# Patient Record
Sex: FEMALE | Race: WHITE | NOT HISPANIC OR LATINO
[De-identification: names, ages, dates, MRNs, and addresses within clinical notes are randomized per-mention and may not be internally consistent; named-entity substitution may affect disease eponyms.]

---

## 2022-06-24 RX ORDER — LISDEXAMFETAMINE DIMESYLATE 40 MG/1
CAPSULE ORAL
COMMUNITY

## 2022-12-28 PROBLEM — Z00.00 ENCOUNTER FOR PREVENTIVE HEALTH EXAMINATION: Status: ACTIVE | Noted: 2022-12-28

## 2023-01-13 ENCOUNTER — NON-APPOINTMENT (OUTPATIENT)
Age: 33
End: 2023-01-13

## 2023-01-18 ENCOUNTER — APPOINTMENT (OUTPATIENT)
Dept: OBGYN | Facility: CLINIC | Age: 33
End: 2023-01-18
Payer: COMMERCIAL

## 2023-01-18 ENCOUNTER — LABORATORY RESULT (OUTPATIENT)
Age: 33
End: 2023-01-18

## 2023-01-18 ENCOUNTER — TRANSCRIPTION ENCOUNTER (OUTPATIENT)
Age: 33
End: 2023-01-18

## 2023-01-18 ENCOUNTER — ASOB RESULT (OUTPATIENT)
Age: 33
End: 2023-01-18

## 2023-01-18 VITALS
DIASTOLIC BLOOD PRESSURE: 77 MMHG | HEART RATE: 71 BPM | SYSTOLIC BLOOD PRESSURE: 111 MMHG | BODY MASS INDEX: 22.33 KG/M2 | WEIGHT: 134 LBS | HEIGHT: 65 IN

## 2023-01-18 DIAGNOSIS — Z78.9 OTHER SPECIFIED HEALTH STATUS: ICD-10-CM

## 2023-01-18 DIAGNOSIS — G43.909 MIGRAINE, UNSPECIFIED, NOT INTRACTABLE, W/OUT STATUS MIGRAINOSUS: ICD-10-CM

## 2023-01-18 PROCEDURE — 76816 OB US FOLLOW-UP PER FETUS: CPT

## 2023-01-18 PROCEDURE — 0500F INITIAL PRENATAL CARE VISIT: CPT

## 2023-01-22 LAB
ABO + RH PNL BLD: NORMAL
ALBUMIN SERPL ELPH-MCNC: 4.4 G/DL
ALP BLD-CCNC: 47 U/L
ALT SERPL-CCNC: 10 U/L
ANION GAP SERPL CALC-SCNC: 12 MMOL/L
AST SERPL-CCNC: 14 U/L
B19V IGG SER QL IA: 8.79 INDEX
B19V IGG+IGM SER-IMP: NORMAL
B19V IGG+IGM SER-IMP: POSITIVE
B19V IGM FLD-ACNC: 0.17 INDEX
B19V IGM SER-ACNC: NEGATIVE
BACTERIA UR CULT: NORMAL
BASOPHILS # BLD AUTO: 0.03 K/UL
BASOPHILS NFR BLD AUTO: 0.3 %
BILIRUB SERPL-MCNC: 0.4 MG/DL
BLD GP AB SCN SERPL QL: NORMAL
BUN SERPL-MCNC: 10 MG/DL
C TRACH RRNA SPEC QL NAA+PROBE: NOT DETECTED
CALCIUM SERPL-MCNC: 9.3 MG/DL
CHLORIDE SERPL-SCNC: 102 MMOL/L
CO2 SERPL-SCNC: 20 MMOL/L
CREAT SERPL-MCNC: 0.6 MG/DL
EGFR: 122 ML/MIN/1.73M2
EOSINOPHIL # BLD AUTO: 0.07 K/UL
EOSINOPHIL NFR BLD AUTO: 0.8 %
GLUCOSE SERPL-MCNC: 60 MG/DL
HBV SURFACE AG SER QL: NONREACTIVE
HCT VFR BLD CALC: 39.3 %
HCV AB SER QL: NONREACTIVE
HCV S/CO RATIO: 0.07 S/CO
HGB BLD-MCNC: 13.4 G/DL
HIV1+2 AB SPEC QL IA.RAPID: NONREACTIVE
IMM GRANULOCYTES NFR BLD AUTO: 0.2 %
LEAD BLD-MCNC: <1 UG/DL
LYMPHOCYTES # BLD AUTO: 1.83 K/UL
LYMPHOCYTES NFR BLD AUTO: 20 %
MAN DIFF?: NORMAL
MCHC RBC-ENTMCNC: 30.4 PG
MCHC RBC-ENTMCNC: 34.1 GM/DL
MCV RBC AUTO: 89.1 FL
MONOCYTES # BLD AUTO: 0.73 K/UL
MONOCYTES NFR BLD AUTO: 8 %
N GONORRHOEA RRNA SPEC QL NAA+PROBE: NOT DETECTED
NEUTROPHILS # BLD AUTO: 6.45 K/UL
NEUTROPHILS NFR BLD AUTO: 70.7 %
PLATELET # BLD AUTO: 205 K/UL
POTASSIUM SERPL-SCNC: 4.1 MMOL/L
PROT SERPL-MCNC: 6.8 G/DL
RBC # BLD: 4.41 M/UL
RBC # FLD: 12.4 %
RUBV IGG FLD-ACNC: 1.9 INDEX
RUBV IGG SER-IMP: POSITIVE
SODIUM SERPL-SCNC: 135 MMOL/L
SOURCE AMPLIFICATION: NORMAL
T PALLIDUM AB SER QL IA: NEGATIVE
TSH SERPL-ACNC: 1.46 UIU/ML
VZV AB TITR SER: POSITIVE
VZV IGG SER IF-ACNC: 1158 INDEX
WBC # FLD AUTO: 9.13 K/UL

## 2023-01-24 ENCOUNTER — NON-APPOINTMENT (OUTPATIENT)
Age: 33
End: 2023-01-24

## 2023-02-06 PROBLEM — G43.909 MIGRAINES: Status: RESOLVED | Noted: 2023-02-06 | Resolved: 2023-02-06

## 2023-02-08 ENCOUNTER — NON-APPOINTMENT (OUTPATIENT)
Age: 33
End: 2023-02-08

## 2023-02-08 ENCOUNTER — APPOINTMENT (OUTPATIENT)
Dept: OBGYN | Facility: CLINIC | Age: 33
End: 2023-02-08
Payer: COMMERCIAL

## 2023-02-08 ENCOUNTER — APPOINTMENT (OUTPATIENT)
Dept: ANTEPARTUM | Facility: CLINIC | Age: 33
End: 2023-02-08
Payer: COMMERCIAL

## 2023-02-08 ENCOUNTER — ASOB RESULT (OUTPATIENT)
Age: 33
End: 2023-02-08

## 2023-02-08 PROCEDURE — 99211 OFF/OP EST MAY X REQ PHY/QHP: CPT

## 2023-02-08 PROCEDURE — 76801 OB US < 14 WKS SINGLE FETUS: CPT

## 2023-02-08 PROCEDURE — 76813 OB US NUCHAL MEAS 1 GEST: CPT

## 2023-02-13 ENCOUNTER — NON-APPOINTMENT (OUTPATIENT)
Age: 33
End: 2023-02-13

## 2023-02-15 ENCOUNTER — APPOINTMENT (OUTPATIENT)
Dept: OBGYN | Facility: CLINIC | Age: 33
End: 2023-02-15

## 2023-02-15 ENCOUNTER — NON-APPOINTMENT (OUTPATIENT)
Age: 33
End: 2023-02-15

## 2023-02-17 ENCOUNTER — NON-APPOINTMENT (OUTPATIENT)
Age: 33
End: 2023-02-17

## 2023-02-28 ENCOUNTER — APPOINTMENT (OUTPATIENT)
Dept: OBGYN | Facility: CLINIC | Age: 33
End: 2023-02-28
Payer: COMMERCIAL

## 2023-02-28 VITALS
BODY MASS INDEX: 23.82 KG/M2 | HEIGHT: 65 IN | DIASTOLIC BLOOD PRESSURE: 71 MMHG | SYSTOLIC BLOOD PRESSURE: 111 MMHG | HEART RATE: 81 BPM | WEIGHT: 143 LBS

## 2023-02-28 PROCEDURE — 0502F SUBSEQUENT PRENATAL CARE: CPT

## 2023-03-08 ENCOUNTER — NON-APPOINTMENT (OUTPATIENT)
Age: 33
End: 2023-03-08

## 2023-03-13 ENCOUNTER — NON-APPOINTMENT (OUTPATIENT)
Age: 33
End: 2023-03-13

## 2023-03-30 ENCOUNTER — APPOINTMENT (OUTPATIENT)
Dept: OBGYN | Facility: CLINIC | Age: 33
End: 2023-03-30

## 2023-03-30 VITALS
WEIGHT: 144 LBS | DIASTOLIC BLOOD PRESSURE: 68 MMHG | BODY MASS INDEX: 23.99 KG/M2 | SYSTOLIC BLOOD PRESSURE: 109 MMHG | HEIGHT: 65 IN

## 2023-03-30 VITALS — HEIGHT: 65 IN

## 2023-04-05 ENCOUNTER — ASOB RESULT (OUTPATIENT)
Age: 33
End: 2023-04-05

## 2023-04-05 ENCOUNTER — APPOINTMENT (OUTPATIENT)
Dept: ANTEPARTUM | Facility: CLINIC | Age: 33
End: 2023-04-05
Payer: COMMERCIAL

## 2023-04-05 PROCEDURE — 76811 OB US DETAILED SNGL FETUS: CPT

## 2023-04-05 PROCEDURE — 99203 OFFICE O/P NEW LOW 30 MIN: CPT | Mod: 25

## 2023-04-10 ENCOUNTER — TRANSCRIPTION ENCOUNTER (OUTPATIENT)
Age: 33
End: 2023-04-10

## 2023-04-10 ENCOUNTER — NON-APPOINTMENT (OUTPATIENT)
Age: 33
End: 2023-04-10

## 2023-04-10 LAB
AFP MOM: 1.91
AFP VALUE: 101.7 NG/ML
ALPHA FETOPROTEIN SERUM COMMENT: NORMAL
ALPHA FETOPROTEIN SERUM INTERPRETATION: NORMAL
ALPHA FETOPROTEIN SERUM RESULTS: NORMAL
ALPHA FETOPROTEIN SERUM TEST RESULTS: NORMAL
GESTATIONAL AGE BASED ON: NORMAL
GESTATIONAL AGE ON COLLECTION DATE: 19.1 WEEKS
INSULIN DEP DIABETES: NO
MATERNAL AGE AT EDD AFP: 33.3 YR
MULTIPLE GESTATION: NO
OSBR RISK 1 IN: 987
RACE: NORMAL
WEIGHT AFP: 144 LBS

## 2023-04-11 ENCOUNTER — TRANSCRIPTION ENCOUNTER (OUTPATIENT)
Age: 33
End: 2023-04-11

## 2023-04-18 ENCOUNTER — APPOINTMENT (OUTPATIENT)
Dept: ANTEPARTUM | Facility: CLINIC | Age: 33
End: 2023-04-18
Payer: COMMERCIAL

## 2023-04-18 ENCOUNTER — ASOB RESULT (OUTPATIENT)
Age: 33
End: 2023-04-18

## 2023-04-18 PROCEDURE — 93325 DOPPLER ECHO COLOR FLOW MAPG: CPT

## 2023-04-18 PROCEDURE — 76827 ECHO EXAM OF FETAL HEART: CPT

## 2023-04-18 PROCEDURE — 76825 ECHO EXAM OF FETAL HEART: CPT

## 2023-04-20 ENCOUNTER — TRANSCRIPTION ENCOUNTER (OUTPATIENT)
Age: 33
End: 2023-04-20

## 2023-04-25 ENCOUNTER — APPOINTMENT (OUTPATIENT)
Dept: OBGYN | Facility: CLINIC | Age: 33
End: 2023-04-25
Payer: COMMERCIAL

## 2023-04-25 ENCOUNTER — NON-APPOINTMENT (OUTPATIENT)
Age: 33
End: 2023-04-25

## 2023-04-25 VITALS
WEIGHT: 151 LBS | SYSTOLIC BLOOD PRESSURE: 101 MMHG | BODY MASS INDEX: 25.16 KG/M2 | HEIGHT: 65 IN | DIASTOLIC BLOOD PRESSURE: 67 MMHG

## 2023-04-25 PROCEDURE — 0502F SUBSEQUENT PRENATAL CARE: CPT

## 2023-05-18 ENCOUNTER — OUTPATIENT (OUTPATIENT)
Dept: INPATIENT UNIT | Facility: HOSPITAL | Age: 33
LOS: 1 days | Discharge: HOME | End: 2023-05-18
Payer: COMMERCIAL

## 2023-05-18 ENCOUNTER — NON-APPOINTMENT (OUTPATIENT)
Age: 33
End: 2023-05-18

## 2023-05-18 VITALS — HEART RATE: 75 BPM | DIASTOLIC BLOOD PRESSURE: 55 MMHG | TEMPERATURE: 98 F | SYSTOLIC BLOOD PRESSURE: 89 MMHG

## 2023-05-18 VITALS
DIASTOLIC BLOOD PRESSURE: 59 MMHG | TEMPERATURE: 98 F | RESPIRATION RATE: 16 BRPM | HEART RATE: 86 BPM | SYSTOLIC BLOOD PRESSURE: 89 MMHG

## 2023-05-18 DIAGNOSIS — O26.899 OTHER SPECIFIED PREGNANCY RELATED CONDITIONS, UNSPECIFIED TRIMESTER: ICD-10-CM

## 2023-05-18 LAB
APPEARANCE UR: CLEAR — SIGNIFICANT CHANGE UP
APTT BLD: 23.5 SEC — LOW (ref 27–36.3)
BASOPHILS # BLD AUTO: 0.02 K/UL — SIGNIFICANT CHANGE UP (ref 0–0.2)
BASOPHILS NFR BLD AUTO: 0.2 % — SIGNIFICANT CHANGE UP (ref 0–2)
BILIRUB UR-MCNC: NEGATIVE — SIGNIFICANT CHANGE UP
COLOR SPEC: SIGNIFICANT CHANGE UP
DIFF PNL FLD: NEGATIVE — SIGNIFICANT CHANGE UP
EOSINOPHIL # BLD AUTO: 0.11 K/UL — SIGNIFICANT CHANGE UP (ref 0–0.5)
EOSINOPHIL NFR BLD AUTO: 1 % — SIGNIFICANT CHANGE UP (ref 0–6)
FIBRINOGEN PPP-MCNC: 361 MG/DL — SIGNIFICANT CHANGE UP (ref 200–465)
GLUCOSE UR QL: NEGATIVE — SIGNIFICANT CHANGE UP
HCT VFR BLD CALC: 31.9 % — LOW (ref 34.5–45)
HGB BLD-MCNC: 10.5 G/DL — LOW (ref 11.5–15.5)
IANC: 7.87 K/UL — HIGH (ref 1.8–7.4)
IMM GRANULOCYTES NFR BLD AUTO: 0.6 % — SIGNIFICANT CHANGE UP (ref 0–0.9)
INR BLD: 0.96 RATIO — SIGNIFICANT CHANGE UP (ref 0.88–1.16)
KETONES UR-MCNC: NEGATIVE — SIGNIFICANT CHANGE UP
LEUKOCYTE ESTERASE UR-ACNC: NEGATIVE — SIGNIFICANT CHANGE UP
LYMPHOCYTES # BLD AUTO: 1.91 K/UL — SIGNIFICANT CHANGE UP (ref 1–3.3)
LYMPHOCYTES # BLD AUTO: 17.7 % — SIGNIFICANT CHANGE UP (ref 13–44)
MCHC RBC-ENTMCNC: 30.2 PG — SIGNIFICANT CHANGE UP (ref 27–34)
MCHC RBC-ENTMCNC: 32.9 GM/DL — SIGNIFICANT CHANGE UP (ref 32–36)
MCV RBC AUTO: 91.7 FL — SIGNIFICANT CHANGE UP (ref 80–100)
MONOCYTES # BLD AUTO: 0.79 K/UL — SIGNIFICANT CHANGE UP (ref 0–0.9)
MONOCYTES NFR BLD AUTO: 7.3 % — SIGNIFICANT CHANGE UP (ref 2–14)
NEUTROPHILS # BLD AUTO: 7.87 K/UL — HIGH (ref 1.8–7.4)
NEUTROPHILS NFR BLD AUTO: 73.2 % — SIGNIFICANT CHANGE UP (ref 43–77)
NITRITE UR-MCNC: NEGATIVE — SIGNIFICANT CHANGE UP
NRBC # BLD: 0 /100 WBCS — SIGNIFICANT CHANGE UP (ref 0–0)
NRBC # FLD: 0 K/UL — SIGNIFICANT CHANGE UP (ref 0–0)
PH UR: 6.5 — SIGNIFICANT CHANGE UP (ref 5–8)
PLATELET # BLD AUTO: 206 K/UL — SIGNIFICANT CHANGE UP (ref 150–400)
PROT UR-MCNC: NEGATIVE — SIGNIFICANT CHANGE UP
PROTHROM AB SERPL-ACNC: 11.1 SEC — SIGNIFICANT CHANGE UP (ref 10.5–13.4)
RBC # BLD: 3.48 M/UL — LOW (ref 3.8–5.2)
RBC # FLD: 13.3 % — SIGNIFICANT CHANGE UP (ref 10.3–14.5)
SP GR SPEC: 1.01 — SIGNIFICANT CHANGE UP (ref 1.01–1.05)
UROBILINOGEN FLD QL: SIGNIFICANT CHANGE UP
WBC # BLD: 10.77 K/UL — HIGH (ref 3.8–10.5)
WBC # FLD AUTO: 10.77 K/UL — HIGH (ref 3.8–10.5)

## 2023-05-18 PROCEDURE — 59025 FETAL NON-STRESS TEST: CPT | Mod: 26

## 2023-05-18 PROCEDURE — 99221 1ST HOSP IP/OBS SF/LOW 40: CPT | Mod: 25

## 2023-05-18 NOTE — OB PROVIDER TRIAGE NOTE - ADDITIONAL INSTRUCTIONS
- Patient stable for discharge home with adequate outpatient follow up  - Instructed patient to follow up with OB/GYN within 1 week  - Educated and discussed  labor precautions  - Educated and discussed concerning signs/symptoms to call OB/GYN and return to L&D including but not limited to vaginal bleeding, leakage of fluid, painful contractions, decreased fetal movement, fever/chills, shortness of breath, chest pain, rash, difficulty ambulating, nausea/vomiting/diarrhea, worsening of symptoms

## 2023-05-18 NOTE — OB RN TRIAGE NOTE - FALL HARM RISK - RISK INTERVENTIONS

## 2023-05-18 NOTE — OB PROVIDER TRIAGE NOTE - NSOBPROVIDERNOTE_OBGYN_ALL_OB_FT
32 y/o  IVF pregnancy at 26.1 weeks presents s/p falling down 2 steps at 5pm  - Abruption labs sent, wnl  - Cervix appears closed, CL 3.74-4.18  - Cold compress given for ankle 34 y/o  IVF pregnancy at 26.1 weeks presents s/p falling down 2 steps at 5pm  - Abruption labs sent, wnl  - Reactive NST x 4 hrs   - Cervix appears closed, CL 3.74-4.18  - Cold compress given for ankle  - Patient stable for discharge home with adequate outpatient follow up  - Instructed patient to follow up with OB/GYN within 1 week  - Educated and discussed  labor precautions  - Educated and discussed concerning signs/symptoms to call OB/GYN and return to L&D including but not limited to vaginal bleeding, leakage of fluid, painful contractions, decreased fetal movement, fever/chills, shortness of breath, chest pain, rash, difficulty ambulating, nausea/vomiting/diarrhea, worsening of symptoms  - Patient states she understands and agrees with assessment and plan, all questions answered  - Discussed with Dr. Cheney  - Patient discharged at 11:43pm

## 2023-05-18 NOTE — OB PROVIDER TRIAGE NOTE - NSHPPHYSICALEXAM_GEN_ALL_CORE
Vital Signs Last 24 Hrs  T(C): 36.5 (18 May 2023 19:28), Max: 36.5 (18 May 2023 19:19)  T(F): 97.7 (18 May 2023 19:28), Max: 97.7 (18 May 2023 19:19)  HR: 82 (18 May 2023 21:02) (82 - 86)  BP: 96/58 (18 May 2023 21:02) (89/59 - 96/58)  BP(mean): --  RR: 16 (18 May 2023 19:32) (16 - 16)  SpO2: --    A&O x 3  Lungs: CTAB  Abd: gravid, soft nontender to palpation  EFM: baseline 145, moderate variability, + accels, no decels, no ctx  SSE: cervix closed  TAS: breech presentation, anterior placenta - ?partial placenta previa, good fetal movement, MVP 5.16mm  TVS: CL 3.74-4.18 Vital Signs Last 24 Hrs  T(C): 36.5 (18 May 2023 19:28), Max: 36.5 (18 May 2023 19:19)  T(F): 97.7 (18 May 2023 19:28), Max: 97.7 (18 May 2023 19:19)  HR: 82 (18 May 2023 21:02) (82 - 86)  BP: 96/58 (18 May 2023 21:02) (89/59 - 96/58)  BP(mean): --  RR: 16 (18 May 2023 19:32) (16 - 16)  SpO2: --    A&O x 3  Lungs: CTAB  Abd: gravid, soft nontender to palpation  Ext: full ROM of ankles B/L, no swelling   EFM: baseline 145, moderate variability, + accels, no decels, no ctx  SSE: cervix closed  TAS: breech presentation, anterior placenta - ?partial placenta previa, good fetal movement, MVP 5.16mm  TVS: CL 3.74-4.18

## 2023-05-18 NOTE — OB PROVIDER TRIAGE NOTE - PRINCIPAL DIAGNOSIS
Injury, poisoning and certain other consequences of external causes complicating pregnancy, unspecified trimester

## 2023-05-18 NOTE — OB PROVIDER TRIAGE NOTE - NSHPLABSRESULTS_GEN_ALL_CORE
CBC Full  -  ( 18 May 2023 19:50 )  WBC Count : 10.77 K/uL  RBC Count : 3.48 M/uL  Hemoglobin : 10.5 g/dL  Hematocrit : 31.9 %  Platelet Count - Automated : 206 K/uL  Mean Cell Volume : 91.7 fL  Mean Cell Hemoglobin : 30.2 pg  Mean Cell Hemoglobin Concentration : 32.9 gm/dL  Auto Neutrophil # : 7.87 K/uL  Auto Lymphocyte # : 1.91 K/uL  Auto Monocyte # : 0.79 K/uL  Auto Eosinophil # : 0.11 K/uL  Auto Basophil # : 0.02 K/uL  Auto Neutrophil % : 73.2 %  Auto Lymphocyte % : 17.7 %  Auto Monocyte % : 7.3 %  Auto Eosinophil % : 1.0 %  Auto Basophil % : 0.2 %    PT/INR - ( 18 May 2023 19:50 )   PT: 11.1 sec;   INR: 0.96 ratio         PTT - ( 18 May 2023 19:50 )  PTT:23.5 sec      Urinalysis Basic - ( 18 May 2023 22:00 )    Color: Light Yellow / Appearance: Clear / S.010 / pH: x  Gluc: x / Ketone: Negative  / Bili: Negative / Urobili: <2 mg/dL   Blood: x / Protein: Negative / Nitrite: Negative   Leuk Esterase: Negative / RBC: x / WBC x   Sq Epi: x / Non Sq Epi: x / Bacteria: x

## 2023-05-18 NOTE — OB PROVIDER TRIAGE NOTE - HISTORY OF PRESENT ILLNESS
32 y/o  IVF pregnancy at 26.1 weeks presents s/p falling down 2 steps. States she was running after her dog when she missed a step and twisted her ankle. Patient states she does not know how she landed but does not think she hit her abdomen or head. Denies . Reports good fetal movement. Denies contractions, vaginal bleeding, leakage of clear fluid.   PNC significant for placenta previa 34 y/o  IVF pregnancy at 26.1 weeks presents s/p falling down 2 steps at 5pm. States she was running after her dog when she missed a step and twisted her ankle. Patient states she does not know how she landed but does not think she hit her abdomen or head. Denies abdominal pain, contractions, vaginal bleeding, leakage of clear fluid. Patient is able to bare weight on right ankle. Reports good fetal movement.  PNC significant for placenta previa

## 2023-05-19 DIAGNOSIS — O09.292 SUPERVISION OF PREGNANCY WITH OTHER POOR REPRODUCTIVE OR OBSTETRIC HISTORY, SECOND TRIMESTER: ICD-10-CM

## 2023-05-19 DIAGNOSIS — Z04.3 ENCOUNTER FOR EXAMINATION AND OBSERVATION FOLLOWING OTHER ACCIDENT: ICD-10-CM

## 2023-05-19 DIAGNOSIS — O44.02 COMPLETE PLACENTA PREVIA NOS OR WITHOUT HEMORRHAGE, SECOND TRIMESTER: ICD-10-CM

## 2023-05-19 DIAGNOSIS — Z3A.26 26 WEEKS GESTATION OF PREGNANCY: ICD-10-CM

## 2023-05-19 DIAGNOSIS — O09.812 SUPERVISION OF PREGNANCY RESULTING FROM ASSISTED REPRODUCTIVE TECHNOLOGY, SECOND TRIMESTER: ICD-10-CM

## 2023-05-19 LAB — KLEIHAUER-BETKE CALCULATION: 0 % — SIGNIFICANT CHANGE UP

## 2023-05-22 ENCOUNTER — NON-APPOINTMENT (OUTPATIENT)
Age: 33
End: 2023-05-22

## 2023-05-22 ENCOUNTER — APPOINTMENT (OUTPATIENT)
Dept: OBGYN | Facility: CLINIC | Age: 33
End: 2023-05-22
Payer: COMMERCIAL

## 2023-05-22 VITALS
HEIGHT: 65 IN | HEART RATE: 91 BPM | DIASTOLIC BLOOD PRESSURE: 73 MMHG | BODY MASS INDEX: 25.91 KG/M2 | WEIGHT: 155.5 LBS | SYSTOLIC BLOOD PRESSURE: 109 MMHG

## 2023-05-22 VITALS — SYSTOLIC BLOOD PRESSURE: 113 MMHG | DIASTOLIC BLOOD PRESSURE: 80 MMHG

## 2023-05-22 PROCEDURE — 0502F SUBSEQUENT PRENATAL CARE: CPT

## 2023-05-25 ENCOUNTER — NON-APPOINTMENT (OUTPATIENT)
Age: 33
End: 2023-05-25

## 2023-05-25 LAB
ABO + RH PNL BLD: NORMAL
ALBUMIN SERPL ELPH-MCNC: 3.7 G/DL
ALP BLD-CCNC: 65 U/L
ALT SERPL-CCNC: 9 U/L
ANION GAP SERPL CALC-SCNC: 16 MMOL/L
AST SERPL-CCNC: 15 U/L
BILIRUB SERPL-MCNC: 0.2 MG/DL
BLD GP AB SCN SERPL QL: NORMAL
BUN SERPL-MCNC: 11 MG/DL
CALCIUM SERPL-MCNC: 8.6 MG/DL
CHLORIDE SERPL-SCNC: 99 MMOL/L
CO2 SERPL-SCNC: 18 MMOL/L
CREAT SERPL-MCNC: 0.55 MG/DL
EGFR: 124 ML/MIN/1.73M2
GLUCOSE 1H P 50 G GLC PO SERPL-MCNC: 193 MG/DL
POTASSIUM SERPL-SCNC: 3.6 MMOL/L
PROT SERPL-MCNC: 5.8 G/DL
SODIUM SERPL-SCNC: 134 MMOL/L
T PALLIDUM AB SER QL IA: NEGATIVE

## 2023-05-27 ENCOUNTER — NON-APPOINTMENT (OUTPATIENT)
Age: 33
End: 2023-05-27

## 2023-05-27 ENCOUNTER — TRANSCRIPTION ENCOUNTER (OUTPATIENT)
Age: 33
End: 2023-05-27

## 2023-05-30 ENCOUNTER — APPOINTMENT (OUTPATIENT)
Dept: ANTEPARTUM | Facility: CLINIC | Age: 33
End: 2023-05-30

## 2023-05-30 ENCOUNTER — ASOB RESULT (OUTPATIENT)
Age: 33
End: 2023-05-30

## 2023-05-30 ENCOUNTER — APPOINTMENT (OUTPATIENT)
Dept: ANTEPARTUM | Facility: CLINIC | Age: 33
End: 2023-05-30
Payer: COMMERCIAL

## 2023-05-30 PROCEDURE — 76816 OB US FOLLOW-UP PER FETUS: CPT

## 2023-05-31 ENCOUNTER — APPOINTMENT (OUTPATIENT)
Dept: OBGYN | Facility: CLINIC | Age: 33
End: 2023-05-31

## 2023-06-01 ENCOUNTER — APPOINTMENT (OUTPATIENT)
Dept: OBGYN | Facility: CLINIC | Age: 33
End: 2023-06-01

## 2023-06-02 ENCOUNTER — NON-APPOINTMENT (OUTPATIENT)
Age: 33
End: 2023-06-02

## 2023-06-03 ENCOUNTER — NON-APPOINTMENT (OUTPATIENT)
Age: 33
End: 2023-06-03

## 2023-06-07 ENCOUNTER — APPOINTMENT (OUTPATIENT)
Dept: MATERNAL FETAL MEDICINE | Facility: CLINIC | Age: 33
End: 2023-06-07
Payer: COMMERCIAL

## 2023-06-07 ENCOUNTER — ASOB RESULT (OUTPATIENT)
Age: 33
End: 2023-06-07

## 2023-06-07 PROCEDURE — G0109 DIAB MANAGE TRN IND/GROUP: CPT | Mod: 95

## 2023-06-12 ENCOUNTER — TRANSCRIPTION ENCOUNTER (OUTPATIENT)
Age: 33
End: 2023-06-12

## 2023-06-13 ENCOUNTER — APPOINTMENT (OUTPATIENT)
Dept: OBGYN | Facility: CLINIC | Age: 33
End: 2023-06-13

## 2023-06-14 ENCOUNTER — NON-APPOINTMENT (OUTPATIENT)
Age: 33
End: 2023-06-14

## 2023-06-14 ENCOUNTER — RESULT REVIEW (OUTPATIENT)
Age: 33
End: 2023-06-14

## 2023-06-14 ENCOUNTER — ASOB RESULT (OUTPATIENT)
Age: 33
End: 2023-06-14

## 2023-06-14 ENCOUNTER — APPOINTMENT (OUTPATIENT)
Dept: OBGYN | Facility: CLINIC | Age: 33
End: 2023-06-14
Payer: COMMERCIAL

## 2023-06-14 ENCOUNTER — APPOINTMENT (OUTPATIENT)
Dept: MATERNAL FETAL MEDICINE | Facility: CLINIC | Age: 33
End: 2023-06-14
Payer: COMMERCIAL

## 2023-06-14 VITALS
HEIGHT: 65 IN | BODY MASS INDEX: 25.49 KG/M2 | HEART RATE: 76 BPM | WEIGHT: 153 LBS | DIASTOLIC BLOOD PRESSURE: 72 MMHG | SYSTOLIC BLOOD PRESSURE: 107 MMHG

## 2023-06-14 PROCEDURE — 0502F SUBSEQUENT PRENATAL CARE: CPT

## 2023-06-14 PROCEDURE — G0108 DIAB MANAGE TRN  PER INDIV: CPT | Mod: 95

## 2023-06-15 ENCOUNTER — NON-APPOINTMENT (OUTPATIENT)
Age: 33
End: 2023-06-15

## 2023-06-15 ENCOUNTER — APPOINTMENT (OUTPATIENT)
Dept: OBGYN | Facility: CLINIC | Age: 33
End: 2023-06-15
Payer: COMMERCIAL

## 2023-06-15 PROCEDURE — 96372 THER/PROPH/DIAG INJ SC/IM: CPT

## 2023-06-15 RX ADMIN — HUMAN RHO(D) IMMUNE GLOBULIN 0 UNIT: 300 INJECTION, SOLUTION INTRAMUSCULAR at 00:00

## 2023-06-16 ENCOUNTER — OUTPATIENT (OUTPATIENT)
Dept: OUTPATIENT SERVICES | Facility: HOSPITAL | Age: 33
LOS: 1 days | End: 2023-06-16
Payer: COMMERCIAL

## 2023-06-16 ENCOUNTER — RESULT REVIEW (OUTPATIENT)
Age: 33
End: 2023-06-16

## 2023-06-16 ENCOUNTER — APPOINTMENT (OUTPATIENT)
Dept: ULTRASOUND IMAGING | Facility: CLINIC | Age: 33
End: 2023-06-16
Payer: COMMERCIAL

## 2023-06-16 DIAGNOSIS — R59.0 LOCALIZED ENLARGED LYMPH NODES: ICD-10-CM

## 2023-06-16 PROCEDURE — 76641 ULTRASOUND BREAST COMPLETE: CPT

## 2023-06-16 PROCEDURE — 76641 ULTRASOUND BREAST COMPLETE: CPT | Mod: 26,RT

## 2023-06-20 ENCOUNTER — TRANSCRIPTION ENCOUNTER (OUTPATIENT)
Age: 33
End: 2023-06-20

## 2023-06-27 ENCOUNTER — ASOB RESULT (OUTPATIENT)
Age: 33
End: 2023-06-27

## 2023-06-27 ENCOUNTER — APPOINTMENT (OUTPATIENT)
Dept: ANTEPARTUM | Facility: CLINIC | Age: 33
End: 2023-06-27
Payer: COMMERCIAL

## 2023-06-27 ENCOUNTER — NON-APPOINTMENT (OUTPATIENT)
Age: 33
End: 2023-06-27

## 2023-06-27 PROCEDURE — 76816 OB US FOLLOW-UP PER FETUS: CPT

## 2023-06-27 PROCEDURE — 76819 FETAL BIOPHYS PROFIL W/O NST: CPT

## 2023-06-28 ENCOUNTER — NON-APPOINTMENT (OUTPATIENT)
Age: 33
End: 2023-06-28

## 2023-06-29 ENCOUNTER — NON-APPOINTMENT (OUTPATIENT)
Age: 33
End: 2023-06-29

## 2023-06-29 ENCOUNTER — APPOINTMENT (OUTPATIENT)
Dept: OBGYN | Facility: CLINIC | Age: 33
End: 2023-06-29
Payer: COMMERCIAL

## 2023-06-29 ENCOUNTER — APPOINTMENT (OUTPATIENT)
Dept: MATERNAL FETAL MEDICINE | Facility: CLINIC | Age: 33
End: 2023-06-29
Payer: COMMERCIAL

## 2023-06-29 ENCOUNTER — ASOB RESULT (OUTPATIENT)
Age: 33
End: 2023-06-29

## 2023-06-29 VITALS
WEIGHT: 156.2 LBS | HEART RATE: 73 BPM | HEIGHT: 65 IN | BODY MASS INDEX: 26.02 KG/M2 | SYSTOLIC BLOOD PRESSURE: 111 MMHG | DIASTOLIC BLOOD PRESSURE: 75 MMHG

## 2023-06-29 PROCEDURE — 0502F SUBSEQUENT PRENATAL CARE: CPT

## 2023-06-29 PROCEDURE — G0108 DIAB MANAGE TRN  PER INDIV: CPT | Mod: 95

## 2023-06-29 RX ORDER — ONDANSETRON 8 MG/1
8 TABLET, ORALLY DISINTEGRATING ORAL
Qty: 24 | Refills: 1 | Status: COMPLETED | COMMUNITY
Start: 2023-01-31 | End: 2023-06-29

## 2023-06-29 RX ORDER — DOXYLAMINE SUCCINATE AND PYRIDOXINE HYDROCHLORIDE 10; 10 MG/1; MG/1
10-10 TABLET, DELAYED RELEASE ORAL
Qty: 120 | Refills: 1 | Status: COMPLETED | COMMUNITY
End: 2023-06-29

## 2023-07-05 ENCOUNTER — OUTPATIENT (OUTPATIENT)
Dept: OUTPATIENT SERVICES | Facility: HOSPITAL | Age: 33
LOS: 1 days | Discharge: HOME | End: 2023-07-05
Payer: COMMERCIAL

## 2023-07-05 ENCOUNTER — NON-APPOINTMENT (OUTPATIENT)
Age: 33
End: 2023-07-05

## 2023-07-05 VITALS
RESPIRATION RATE: 16 BRPM | HEART RATE: 71 BPM | DIASTOLIC BLOOD PRESSURE: 62 MMHG | SYSTOLIC BLOOD PRESSURE: 116 MMHG | TEMPERATURE: 98 F

## 2023-07-05 VITALS — SYSTOLIC BLOOD PRESSURE: 94 MMHG | HEART RATE: 64 BPM | DIASTOLIC BLOOD PRESSURE: 55 MMHG

## 2023-07-05 DIAGNOSIS — Z98.890 OTHER SPECIFIED POSTPROCEDURAL STATES: Chronic | ICD-10-CM

## 2023-07-05 DIAGNOSIS — O26.899 OTHER SPECIFIED PREGNANCY RELATED CONDITIONS, UNSPECIFIED TRIMESTER: ICD-10-CM

## 2023-07-05 PROCEDURE — 99221 1ST HOSP IP/OBS SF/LOW 40: CPT

## 2023-07-05 NOTE — OB RN TRIAGE NOTE - FALL HARM RISK - HARM RISK INTERVENTIONS

## 2023-07-05 NOTE — OB PROVIDER TRIAGE NOTE - NSOBPROVIDERNOTE_OBGYN_ALL_OB_FT
Patient is a 32 y/o EDC 2023 EGA 33  Patient is a 34 y/o EDC 2023 EGA 33  evaluated for decreased fetal movement in third trimester. Patient reports of fetal movement and visualized fetal movement  - Discussed with   - Patient to be discharged home with follow up and return precautions  - Please follow up with your obstetrician at your next scheduled appointment next week  - Please return for decreased/no fetal movement, vaginal bleeding similar to that of a period, leaking/gush of fluid, regular contractions or requiring pain medication   - Patient educated of plan and demonstrate understanding. All questions answered. Discharge instructions provided and signed.   - Discharged at 1834

## 2023-07-05 NOTE — OB PROVIDER TRIAGE NOTE - NSHPPHYSICALEXAM_GEN_ALL_CORE
Vital Signs Last 24 Hrs  T(C): 36.4 (05 Jul 2023 17:21), Max: 36.4 (05 Jul 2023 17:21)  T(F): 97.5 (05 Jul 2023 17:21), Max: 97.5 (05 Jul 2023 17:21)  HR: 68 (05 Jul 2023 18:15) (68 - 71)  BP: 104/65 (05 Jul 2023 18:15) (104/64 - 116/62)  RR: 16 (05 Jul 2023 17:21) (16 - 16)    Neuro: Alert and oriented  Lungs: no shortness of breath  Abdomen: soft, non-tender, no contractions on palpation  TAS: Images saved in ASOB, report placed in ASOB. Breech, anterior placenta, m-mode 134, 8/8 BPP, CORI 17.99  FHR: 140 baseline with moderate variability, accelerations present, no decelerations, reactive tracing   CTX: irritability present, patient denies lower back pain, cramping, contractions   LE: no edema present, no calf tenderness Vital Signs Last 24 Hrs  T(C): 36.4 (05 Jul 2023 17:21), Max: 36.4 (05 Jul 2023 17:21)  T(F): 97.5 (05 Jul 2023 17:21), Max: 97.5 (05 Jul 2023 17:21)  HR: 68 (05 Jul 2023 18:15) (68 - 71)  BP: 104/65 (05 Jul 2023 18:15) (104/64 - 116/62)  RR: 16 (05 Jul 2023 17:21) (16 - 16)    Neuro: Alert and oriented  Lungs: no shortness of breath  Abdomen: soft, non-tender, no contractions on palpation  TAS: Images saved in ASOB, no report placed in ASOB. Breech, anterior placenta, m-mode 134, 8/8 BPP, CORI 17.99  FHR: 140 baseline with moderate variability, accelerations present, no decelerations, reactive tracing   CTX: irritability present, patient denies lower back pain, cramping, contractions   LE: no edema present, no calf tenderness

## 2023-07-05 NOTE — OB PROVIDER TRIAGE NOTE - ADDITIONAL INSTRUCTIONS
Patient is a 32 y/o EDC 2023 EGA 33  evaluated for decreased fetal movement in third trimester. Patient reports of fetal movement and visualized fetal movement  - Discussed with   - Patient to be discharged home with follow up and return precautions  - Please follow up with your obstetrician at your next scheduled appointment next week  - Please return for decreased/no fetal movement, vaginal bleeding similar to that of a period, leaking/gush of fluid, regular contractions or requiring pain medication   - Patient educated of plan and demonstrate understanding. All questions answered. Discharge instructions provided and signed.   - Discharged at 1834

## 2023-07-05 NOTE — OB PROVIDER TRIAGE NOTE - GRAVIDA, OB PROFILE
Carolyn is a pleasant 66-year-old female who presents today for a follow up.   Patient currently on Xarelto.  History of diverticulitis and colovesicular fistula.  Status post laparoscopic sigmoid colectomy with colorectal anastomosis, takedown of colovesicular fistula on 12/20/2021.  No evidence of dysplasia or malignancy   Labs dated 1/26/2022 showed a normal hemoglobin.  Normal platelets.  Normal ferritin.  Iron saturation 12% but normal total iron.  Normal vitamin B12/folate  Labs dated 4/6/2022 showed a normal hemoglobin.  Platelets normal.  Normal creatinine.  CT abdomen/pelvis with contrast dated 4/15/2022 showed mildly prominent mesenteric with contiguous soft tissue stranding within the left upper abdomen.  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  Hepatic and left renal cyst.  Small hiatal hernia.  Postsurgical changes related to the sigmoid colon.  Colonic diverticulosis without CT evidence for diverticulitis   CT abdomen/pelvis with contrast dated 12/15/2021 showed diverticulitis of the sigmoid colon with involvement of the bladder wall and either a fistula between the bladder or secondary infection of the bladder given the gas bubble.    Labs dated 12/24/2021 showed RBC 3.51, hemoglobin 9.2, hematocrit 29.3, INR 1.18, potassium 3.4, albumin 3.1, ALT 49 otherwise normal LFTs.  Normal renal function.    Patient's last colonoscopy was about 6 years ago with a personal history of colon polyps. No record available for review. From June 2022 to Dec 2022 she was having normal bowel movements. Ever since she has been dealing with constipation. Last bowel movement before today was Thursday. Notes relief of her bloating after BM today.  Denies nausea, vomiting, heartburn, reflux, dysphagia, odynophagia, hematemesis, coffee ground emesis, abnormal weight loss, or melena. Occasional BRBPR when wiping. Family history of colorectal cancer in her father at age 55. No other GI complaints at this time
2

## 2023-07-05 NOTE — OB PROVIDER TRIAGE NOTE - HISTORY OF PRESENT ILLNESS
PNC:     Patient is a 32 y/o EDC 2023 EGA 33  reports of no fetal movement. Patient typically does not drink caffeine drinks, patient reports no fetal movement noted. Patient reports of fetal movement at time of obtaining patient's history. Patient denies cramping, contractions, loss of fluid, vaginal bleeding.     Antepartum History:   - Placenta previa, resolved  - 2023 - anterior placenta, no previa noted  - GDMA1  - 2023 ATU: breech presentation, anterior placenta, CORI 21.14,  BPP, EFW 2252 4lb 15oz, 91%tile  - 2023 : Palpable mass in right axilla, Birad 2, 5mm cyst

## 2023-07-06 ENCOUNTER — TRANSCRIPTION ENCOUNTER (OUTPATIENT)
Age: 33
End: 2023-07-06

## 2023-07-06 DIAGNOSIS — L72.9 FOLLICULAR CYST OF THE SKIN AND SUBCUTANEOUS TISSUE, UNSPECIFIED: ICD-10-CM

## 2023-07-06 DIAGNOSIS — O99.713 DISEASES OF THE SKIN AND SUBCUTANEOUS TISSUE COMPLICATING PREGNANCY, THIRD TRIMESTER: ICD-10-CM

## 2023-07-06 DIAGNOSIS — O24.410 GESTATIONAL DIABETES MELLITUS IN PREGNANCY, DIET CONTROLLED: ICD-10-CM

## 2023-07-06 DIAGNOSIS — O36.8130 DECREASED FETAL MOVEMENTS, THIRD TRIMESTER, NOT APPLICABLE OR UNSPECIFIED: ICD-10-CM

## 2023-07-06 DIAGNOSIS — Z3A.33 33 WEEKS GESTATION OF PREGNANCY: ICD-10-CM

## 2023-07-06 DIAGNOSIS — O09.293 SUPERVISION OF PREGNANCY WITH OTHER POOR REPRODUCTIVE OR OBSTETRIC HISTORY, THIRD TRIMESTER: ICD-10-CM

## 2023-07-11 ENCOUNTER — TRANSCRIPTION ENCOUNTER (OUTPATIENT)
Age: 33
End: 2023-07-11

## 2023-07-12 ENCOUNTER — TRANSCRIPTION ENCOUNTER (OUTPATIENT)
Age: 33
End: 2023-07-12

## 2023-07-13 ENCOUNTER — APPOINTMENT (OUTPATIENT)
Dept: OBGYN | Facility: CLINIC | Age: 33
End: 2023-07-13

## 2023-07-13 ENCOUNTER — APPOINTMENT (OUTPATIENT)
Dept: OBGYN | Facility: CLINIC | Age: 33
End: 2023-07-13
Payer: COMMERCIAL

## 2023-07-13 VITALS
SYSTOLIC BLOOD PRESSURE: 112 MMHG | HEART RATE: 81 BPM | HEIGHT: 65 IN | BODY MASS INDEX: 26.33 KG/M2 | DIASTOLIC BLOOD PRESSURE: 74 MMHG | WEIGHT: 158 LBS

## 2023-07-13 DIAGNOSIS — Z23 ENCOUNTER FOR IMMUNIZATION: ICD-10-CM

## 2023-07-13 PROCEDURE — 90715 TDAP VACCINE 7 YRS/> IM: CPT

## 2023-07-13 PROCEDURE — 0502F SUBSEQUENT PRENATAL CARE: CPT

## 2023-07-13 PROCEDURE — 90471 IMMUNIZATION ADMIN: CPT

## 2023-07-17 ENCOUNTER — NON-APPOINTMENT (OUTPATIENT)
Age: 33
End: 2023-07-17

## 2023-07-17 ENCOUNTER — TRANSCRIPTION ENCOUNTER (OUTPATIENT)
Age: 33
End: 2023-07-17

## 2023-07-18 ENCOUNTER — TRANSCRIPTION ENCOUNTER (OUTPATIENT)
Age: 33
End: 2023-07-18

## 2023-07-19 ENCOUNTER — NON-APPOINTMENT (OUTPATIENT)
Age: 33
End: 2023-07-19

## 2023-07-20 ENCOUNTER — ASOB RESULT (OUTPATIENT)
Age: 33
End: 2023-07-20

## 2023-07-20 ENCOUNTER — APPOINTMENT (OUTPATIENT)
Dept: MATERNAL FETAL MEDICINE | Facility: CLINIC | Age: 33
End: 2023-07-20
Payer: COMMERCIAL

## 2023-07-20 PROCEDURE — G0108 DIAB MANAGE TRN  PER INDIV: CPT | Mod: 95

## 2023-07-25 ENCOUNTER — APPOINTMENT (OUTPATIENT)
Dept: ANTEPARTUM | Facility: CLINIC | Age: 33
End: 2023-07-25
Payer: COMMERCIAL

## 2023-07-25 ENCOUNTER — ASOB RESULT (OUTPATIENT)
Age: 33
End: 2023-07-25

## 2023-07-25 PROCEDURE — 76818 FETAL BIOPHYS PROFILE W/NST: CPT

## 2023-07-25 PROCEDURE — 99213 OFFICE O/P EST LOW 20 MIN: CPT | Mod: 25

## 2023-07-25 PROCEDURE — 76816 OB US FOLLOW-UP PER FETUS: CPT

## 2023-07-27 ENCOUNTER — APPOINTMENT (OUTPATIENT)
Dept: OBGYN | Facility: CLINIC | Age: 33
End: 2023-07-27
Payer: COMMERCIAL

## 2023-07-27 VITALS
WEIGHT: 158 LBS | SYSTOLIC BLOOD PRESSURE: 109 MMHG | DIASTOLIC BLOOD PRESSURE: 71 MMHG | BODY MASS INDEX: 26.33 KG/M2 | HEIGHT: 65 IN

## 2023-07-27 PROCEDURE — 0502F SUBSEQUENT PRENATAL CARE: CPT

## 2023-07-29 ENCOUNTER — OUTPATIENT (OUTPATIENT)
Dept: INPATIENT UNIT | Facility: HOSPITAL | Age: 33
LOS: 1 days | Discharge: HOME | End: 2023-07-29
Payer: COMMERCIAL

## 2023-07-29 VITALS
DIASTOLIC BLOOD PRESSURE: 75 MMHG | SYSTOLIC BLOOD PRESSURE: 111 MMHG | TEMPERATURE: 98 F | RESPIRATION RATE: 15 BRPM | HEART RATE: 76 BPM

## 2023-07-29 DIAGNOSIS — Z98.890 OTHER SPECIFIED POSTPROCEDURAL STATES: Chronic | ICD-10-CM

## 2023-07-29 DIAGNOSIS — O26.899 OTHER SPECIFIED PREGNANCY RELATED CONDITIONS, UNSPECIFIED TRIMESTER: ICD-10-CM

## 2023-07-29 PROCEDURE — 99221 1ST HOSP IP/OBS SF/LOW 40: CPT | Mod: 25

## 2023-07-29 PROCEDURE — 59025 FETAL NON-STRESS TEST: CPT | Mod: 26

## 2023-07-29 NOTE — OB PROVIDER TRIAGE NOTE - HISTORY OF PRESENT ILLNESS
33y , EGA@ 36.3 weeks, presents for decreased fetal movement x 5 hrs. Patient denies contractions, denies leaking of fluid, denies vaginal bleeding. Pt denies any other concerns.  Antepartum course is significant for:  - GDMA1  - Macrosomia   - IVF pregnancy

## 2023-07-29 NOTE — OB PROVIDER TRIAGE NOTE - NS_TRIAGEEVALUATION_OBGYN_ALL_OB_DT
Dr. Buchanan;   Please see phone message below    1. Should patient be taking ASA?    2. Can Culturell and carbidopa be taken at the same time    3. Patient coughs after taking medications with applesauce. Is there increased risk of aspiration for patient if he is taking meds with applesauce?    Please advise    Thank You!  Ibeth Saba RN  Triage Nurse     29-Jul-2023 23:17

## 2023-07-29 NOTE — OB PROVIDER TRIAGE NOTE - NSOBPROVIDERNOTE_OBGYN_ALL_OB_FT
33y  @36.3w female with good FM and BPP , discharged to home.    - Patient to be discharged home with follow up and return precautions  - Please follow up with your obstetrician at your next scheduled appointment.   - Please return for decreased/no fetal movement, vaginal bleeding similar to that of a period, leaking/gush of fluid, regular contractions occurring 4-5 minutes for one hour or requiring pain medication   - Patient and partner and educated of plan and demonstrate understanding. All questions answered. Discharge instructions provided and signed.   - Discharged at 0010  - Discussed with Dr. Donald

## 2023-07-29 NOTE — OB PROVIDER TRIAGE NOTE - ADDITIONAL INSTRUCTIONS
- Patient to be discharged home with follow up and return precautions  - Please follow up with your obstetrician at your next scheduled appointment.   - Please return for decreased/no fetal movement, vaginal bleeding similar to that of a period, leaking/gush of fluid, regular contractions occurring 4-5 minutes for one hour or requiring pain medication   - Patient and partner and educated of plan and demonstrate understanding. All questions answered. Discharge instructions provided and signed.

## 2023-07-29 NOTE — OB RN TRIAGE NOTE - NSICDXPASTSURGICALHX_GEN_ALL_CORE_FT
PAST SURGICAL HISTORY:  H/O foot surgery      PAST SURGICAL HISTORY:  H/O foot surgery     History of D&C

## 2023-07-29 NOTE — OB PROVIDER TRIAGE NOTE - NSHPPHYSICALEXAM_GEN_ALL_CORE
T(C): --  HR: 63 (07-29-23 @ 23:21) (63 - 63)  BP: 104/67 (07-29-23 @ 23:21) (104/67 - 104/67)  RR: --  SpO2: --  – PE:   CV: RRR  Pulm: breathing comfortably on RA  Abd: soft, gravid, nontender  Extr: moving all extremities with ease  TAUS: report in ASOB, breech position, anterior placenta, M mode: 114 FHR, CORI: 17.65, BPP 8/8, pt endorses seeing FM on ultrasound  NST: baseline 130 FHR, moderate variability, + accels, - decels, reactive

## 2023-07-30 VITALS — SYSTOLIC BLOOD PRESSURE: 119 MMHG | HEART RATE: 67 BPM | TEMPERATURE: 98 F | DIASTOLIC BLOOD PRESSURE: 68 MMHG

## 2023-07-31 DIAGNOSIS — O36.8130 DECREASED FETAL MOVEMENTS, THIRD TRIMESTER, NOT APPLICABLE OR UNSPECIFIED: ICD-10-CM

## 2023-07-31 DIAGNOSIS — O24.410 GESTATIONAL DIABETES MELLITUS IN PREGNANCY, DIET CONTROLLED: ICD-10-CM

## 2023-07-31 DIAGNOSIS — O09.293 SUPERVISION OF PREGNANCY WITH OTHER POOR REPRODUCTIVE OR OBSTETRIC HISTORY, THIRD TRIMESTER: ICD-10-CM

## 2023-07-31 DIAGNOSIS — Z3A.36 36 WEEKS GESTATION OF PREGNANCY: ICD-10-CM

## 2023-07-31 DIAGNOSIS — O09.813 SUPERVISION OF PREGNANCY RESULTING FROM ASSISTED REPRODUCTIVE TECHNOLOGY, THIRD TRIMESTER: ICD-10-CM

## 2023-08-01 ENCOUNTER — APPOINTMENT (OUTPATIENT)
Dept: ANTEPARTUM | Facility: CLINIC | Age: 33
End: 2023-08-01
Payer: COMMERCIAL

## 2023-08-01 ENCOUNTER — ASOB RESULT (OUTPATIENT)
Age: 33
End: 2023-08-01

## 2023-08-01 ENCOUNTER — OUTPATIENT (OUTPATIENT)
Dept: OUTPATIENT SERVICES | Facility: HOSPITAL | Age: 33
LOS: 1 days | End: 2023-08-01
Payer: COMMERCIAL

## 2023-08-01 VITALS
TEMPERATURE: 99 F | DIASTOLIC BLOOD PRESSURE: 78 MMHG | RESPIRATION RATE: 16 BRPM | HEART RATE: 71 BPM | SYSTOLIC BLOOD PRESSURE: 114 MMHG | OXYGEN SATURATION: 97 % | HEIGHT: 64.5 IN | WEIGHT: 158.07 LBS

## 2023-08-01 DIAGNOSIS — Z98.890 OTHER SPECIFIED POSTPROCEDURAL STATES: Chronic | ICD-10-CM

## 2023-08-01 DIAGNOSIS — O24.419 GESTATIONAL DIABETES MELLITUS IN PREGNANCY, UNSPECIFIED CONTROL: ICD-10-CM

## 2023-08-01 LAB
ANION GAP SERPL CALC-SCNC: 16 MMOL/L — HIGH (ref 7–14)
APPEARANCE UR: ABNORMAL
BILIRUB UR-MCNC: NEGATIVE — SIGNIFICANT CHANGE UP
BLD GP AB SCN SERPL QL: POSITIVE — SIGNIFICANT CHANGE UP
BUN SERPL-MCNC: 20 MG/DL — SIGNIFICANT CHANGE UP (ref 7–23)
CALCIUM SERPL-MCNC: 9.1 MG/DL — SIGNIFICANT CHANGE UP (ref 8.4–10.5)
CHLORIDE SERPL-SCNC: 99 MMOL/L — SIGNIFICANT CHANGE UP (ref 98–107)
CO2 SERPL-SCNC: 15 MMOL/L — LOW (ref 22–31)
COLOR SPEC: YELLOW — SIGNIFICANT CHANGE UP
CREAT SERPL-MCNC: 0.73 MG/DL — SIGNIFICANT CHANGE UP (ref 0.5–1.3)
DIFF PNL FLD: NEGATIVE — SIGNIFICANT CHANGE UP
EGFR: 111 ML/MIN/1.73M2 — SIGNIFICANT CHANGE UP
GLUCOSE SERPL-MCNC: 80 MG/DL — SIGNIFICANT CHANGE UP (ref 70–99)
GLUCOSE UR QL: NEGATIVE MG/DL — SIGNIFICANT CHANGE UP
HCT VFR BLD CALC: 34.4 % — LOW (ref 34.5–45)
HGB BLD-MCNC: 11.8 G/DL — SIGNIFICANT CHANGE UP (ref 11.5–15.5)
KETONES UR-MCNC: ABNORMAL MG/DL
LEUKOCYTE ESTERASE UR-ACNC: NEGATIVE — SIGNIFICANT CHANGE UP
MCHC RBC-ENTMCNC: 30.6 PG — SIGNIFICANT CHANGE UP (ref 27–34)
MCHC RBC-ENTMCNC: 34.3 GM/DL — SIGNIFICANT CHANGE UP (ref 32–36)
MCV RBC AUTO: 89.4 FL — SIGNIFICANT CHANGE UP (ref 80–100)
NITRITE UR-MCNC: NEGATIVE — SIGNIFICANT CHANGE UP
NRBC # BLD: 0 /100 WBCS — SIGNIFICANT CHANGE UP (ref 0–0)
NRBC # FLD: 0 K/UL — SIGNIFICANT CHANGE UP (ref 0–0)
PH UR: 6.5 — SIGNIFICANT CHANGE UP (ref 5–8)
PLATELET # BLD AUTO: 183 K/UL — SIGNIFICANT CHANGE UP (ref 150–400)
POTASSIUM SERPL-MCNC: 3.9 MMOL/L — SIGNIFICANT CHANGE UP (ref 3.5–5.3)
POTASSIUM SERPL-SCNC: 3.9 MMOL/L — SIGNIFICANT CHANGE UP (ref 3.5–5.3)
PROT UR-MCNC: NEGATIVE MG/DL — SIGNIFICANT CHANGE UP
RBC # BLD: 3.85 M/UL — SIGNIFICANT CHANGE UP (ref 3.8–5.2)
RBC # FLD: 13.5 % — SIGNIFICANT CHANGE UP (ref 10.3–14.5)
RH IG SCN BLD-IMP: NEGATIVE — SIGNIFICANT CHANGE UP
SODIUM SERPL-SCNC: 130 MMOL/L — LOW (ref 135–145)
SP GR SPEC: 1.01 — SIGNIFICANT CHANGE UP (ref 1–1.03)
UROBILINOGEN FLD QL: 0.2 MG/DL — SIGNIFICANT CHANGE UP (ref 0.2–1)
WBC # BLD: 10.02 K/UL — SIGNIFICANT CHANGE UP (ref 3.8–10.5)
WBC # FLD AUTO: 10.02 K/UL — SIGNIFICANT CHANGE UP (ref 3.8–10.5)

## 2023-08-01 PROCEDURE — 76818 FETAL BIOPHYS PROFILE W/NST: CPT

## 2023-08-01 PROCEDURE — 86077 PHYS BLOOD BANK SERV XMATCH: CPT

## 2023-08-01 RX ORDER — ASPIRIN/CALCIUM CARB/MAGNESIUM 324 MG
1 TABLET ORAL
Refills: 0 | DISCHARGE

## 2023-08-01 RX ORDER — SODIUM CHLORIDE 9 MG/ML
1000 INJECTION, SOLUTION INTRAVENOUS
Refills: 0 | Status: DISCONTINUED | OUTPATIENT
Start: 2023-08-16 | End: 2023-08-19

## 2023-08-01 NOTE — OB PST NOTE - HISTORY OF PRESENT ILLNESS
33 year old pregnant female with IVF pregnancy presents to presurgical testing scheduled for primary Caesarean section due to breech presentation. Pt with gestational diabetes on diet control. Pt received a Rhogam injection. Patient denies vaginal  bleeding, spotting or leakage of amniotic fluid. Patient denies regular contractions. Patient reports positive fetal movement.

## 2023-08-01 NOTE — OB PST NOTE - PROBLEM SELECTOR PLAN 1
Patient tentatively scheduled for Primary  for 23. Pre-op instructions provided. Pt given verbal and written instructions with teach back on chlorhexidine shampoo, and anesthesia/pain management video. Pt verbalized understanding with return demonstration.     CBC BMP T&S UA done

## 2023-08-01 NOTE — OB PST NOTE - FALL HARM RISK - UNIVERSAL INTERVENTIONS
Bed in lowest position, wheels locked, appropriate side rails in place/Call bell, personal items and telephone in reach/Instruct patient to call for assistance before getting out of bed or chair/Non-slip footwear when patient is out of bed/[place] to call system/Physically safe environment - no spills, clutter or unnecessary equipment/Purposeful Proactive Rounding/Room/bathroom lighting operational, light cord in reach

## 2023-08-01 NOTE — OB PST NOTE - NSHPREVIEWOFSYSTEMS_GEN_ALL_CORE
General: no fever, chills, sweating, anorexia, or weight loss. Pt denies h/o COVID-19 infection in the last 90 days.     Skin: no rashes, itching, or dryness    Breast: no tenderness, lumps, or nipple discharge    Ophtalmologic: no diplopia, photophobia, or blurred vision    ENMT: no hearing difficulty, ear pain, tinnitus, or vertigo. No sinus symptoms, nasal congestion, nasal discharge, or nasal obstruction    Respiratory and Thorax: no wheezing, dyspnea, or cough    Cardiovascular: no chest pain, palpitations, dyspnea on exertion, orthopnea, or peripheral edema    Gastrointestinal: no nausea, vomiting, diarrhea, constipation, change in bowel movements, or abdominal pain    Genitourinary and Pelvis: no hematuria, renal colic, flank pain, dysuria, nocturia. No abnormal vaginal bleeding, vaginal discharge, spotting, pelvic pain, or vaginal leakage    Musculoskeletal: no arthralgia, arthritis, joint swelling, muscle cramping, muscle weakness, neck pain, arm pain, or leg pain    Neurological: no transient paralysis, weakness, paresthesias, or seizures. No syncope, tremors, vertigo, loss of sensation, difficulty walking, loss of consciousness    Psychiatric: no suicidal ideation, depression, anxiety    Hematology: no nose bleeding, easy bruising or bleeding, or skin lumps    Lymphatic: no enlarged or tender lymph nodes, or extremity swelling    Endocrine: no heat or cold intolerance    Immunologic: no recurrent or persistent infections

## 2023-08-01 NOTE — OB PST NOTE - LAST ECHOCARDIOGRAM
denies, had a halter monitor in 2018 - reports it was a work up for arrhythmia, reports it has resolved, denies symptoms

## 2023-08-01 NOTE — OB PST NOTE - NSICDXFAMILYHX_GEN_ALL_CORE_FT
FAMILY HISTORY:  Father  Still living? Unknown  FH: hypertension, Age at diagnosis: Age Unknown    Mother  Still living? Unknown  FH: breast cancer, Age at diagnosis: Age Unknown

## 2023-08-02 ENCOUNTER — APPOINTMENT (OUTPATIENT)
Dept: OBGYN | Facility: CLINIC | Age: 33
End: 2023-08-02
Payer: COMMERCIAL

## 2023-08-02 VITALS
DIASTOLIC BLOOD PRESSURE: 76 MMHG | SYSTOLIC BLOOD PRESSURE: 109 MMHG | BODY MASS INDEX: 26.49 KG/M2 | HEART RATE: 81 BPM | HEIGHT: 65 IN | WEIGHT: 159 LBS

## 2023-08-02 LAB
GP B STREP DNA SPEC QL NAA+PROBE: NOT DETECTED
HIV1+2 AB SPEC QL IA.RAPID: NONREACTIVE
SOURCE GBS: NORMAL

## 2023-08-02 PROCEDURE — 0502F SUBSEQUENT PRENATAL CARE: CPT

## 2023-08-08 ENCOUNTER — APPOINTMENT (OUTPATIENT)
Dept: ANTEPARTUM | Facility: CLINIC | Age: 33
End: 2023-08-08

## 2023-08-09 ENCOUNTER — APPOINTMENT (OUTPATIENT)
Dept: OBGYN | Facility: CLINIC | Age: 33
End: 2023-08-09
Payer: COMMERCIAL

## 2023-08-09 ENCOUNTER — ASOB RESULT (OUTPATIENT)
Age: 33
End: 2023-08-09

## 2023-08-09 ENCOUNTER — NON-APPOINTMENT (OUTPATIENT)
Age: 33
End: 2023-08-09

## 2023-08-09 ENCOUNTER — APPOINTMENT (OUTPATIENT)
Dept: ANTEPARTUM | Facility: CLINIC | Age: 33
End: 2023-08-09
Payer: COMMERCIAL

## 2023-08-09 VITALS — DIASTOLIC BLOOD PRESSURE: 73 MMHG | SYSTOLIC BLOOD PRESSURE: 108 MMHG | HEART RATE: 80 BPM

## 2023-08-09 PROCEDURE — 76818 FETAL BIOPHYS PROFILE W/NST: CPT

## 2023-08-09 PROCEDURE — 0502F SUBSEQUENT PRENATAL CARE: CPT

## 2023-08-09 PROCEDURE — 99212 OFFICE O/P EST SF 10 MIN: CPT | Mod: 25

## 2023-08-09 PROCEDURE — 76816 OB US FOLLOW-UP PER FETUS: CPT

## 2023-08-10 PROBLEM — O24.419 GESTATIONAL DIABETES MELLITUS IN PREGNANCY, UNSPECIFIED CONTROL: Chronic | Status: ACTIVE | Noted: 2023-08-01

## 2023-08-15 ENCOUNTER — TRANSCRIPTION ENCOUNTER (OUTPATIENT)
Age: 33
End: 2023-08-15

## 2023-08-15 ENCOUNTER — APPOINTMENT (OUTPATIENT)
Dept: ANTEPARTUM | Facility: CLINIC | Age: 33
End: 2023-08-15
Payer: COMMERCIAL

## 2023-08-15 ENCOUNTER — ASOB RESULT (OUTPATIENT)
Age: 33
End: 2023-08-15

## 2023-08-15 PROCEDURE — 76818 FETAL BIOPHYS PROFILE W/NST: CPT

## 2023-08-16 ENCOUNTER — APPOINTMENT (OUTPATIENT)
Dept: OBGYN | Facility: HOSPITAL | Age: 33
End: 2023-08-16

## 2023-08-16 ENCOUNTER — TRANSCRIPTION ENCOUNTER (OUTPATIENT)
Age: 33
End: 2023-08-16

## 2023-08-16 ENCOUNTER — APPOINTMENT (OUTPATIENT)
Dept: OBGYN | Facility: CLINIC | Age: 33
End: 2023-08-16

## 2023-08-16 ENCOUNTER — INPATIENT (INPATIENT)
Facility: HOSPITAL | Age: 33
LOS: 2 days | Discharge: HOME | End: 2023-08-19
Attending: OBSTETRICS & GYNECOLOGY | Admitting: OBSTETRICS & GYNECOLOGY
Payer: COMMERCIAL

## 2023-08-16 ENCOUNTER — NON-APPOINTMENT (OUTPATIENT)
Age: 33
End: 2023-08-16

## 2023-08-16 VITALS — HEART RATE: 72 BPM | DIASTOLIC BLOOD PRESSURE: 75 MMHG | SYSTOLIC BLOOD PRESSURE: 110 MMHG

## 2023-08-16 DIAGNOSIS — O24.419 GESTATIONAL DIABETES MELLITUS IN PREGNANCY, UNSPECIFIED CONTROL: ICD-10-CM

## 2023-08-16 DIAGNOSIS — Z98.890 OTHER SPECIFIED POSTPROCEDURAL STATES: Chronic | ICD-10-CM

## 2023-08-16 LAB
BASOPHILS # BLD AUTO: 0.02 K/UL — SIGNIFICANT CHANGE UP (ref 0–0.2)
BASOPHILS NFR BLD AUTO: 0.2 % — SIGNIFICANT CHANGE UP (ref 0–2)
BLD GP AB SCN SERPL QL: POSITIVE — SIGNIFICANT CHANGE UP
EOSINOPHIL # BLD AUTO: 0.06 K/UL — SIGNIFICANT CHANGE UP (ref 0–0.5)
EOSINOPHIL NFR BLD AUTO: 0.6 % — SIGNIFICANT CHANGE UP (ref 0–6)
GLUCOSE BLDC GLUCOMTR-MCNC: 79 MG/DL — SIGNIFICANT CHANGE UP (ref 70–99)
HCT VFR BLD CALC: 35.6 % — SIGNIFICANT CHANGE UP (ref 34.5–45)
HGB BLD-MCNC: 12 G/DL — SIGNIFICANT CHANGE UP (ref 11.5–15.5)
IANC: 7.39 K/UL — SIGNIFICANT CHANGE UP (ref 1.8–7.4)
IMM GRANULOCYTES NFR BLD AUTO: 0.7 % — SIGNIFICANT CHANGE UP (ref 0–0.9)
LYMPHOCYTES # BLD AUTO: 1.34 K/UL — SIGNIFICANT CHANGE UP (ref 1–3.3)
LYMPHOCYTES # BLD AUTO: 13.7 % — SIGNIFICANT CHANGE UP (ref 13–44)
MCHC RBC-ENTMCNC: 30.3 PG — SIGNIFICANT CHANGE UP (ref 27–34)
MCHC RBC-ENTMCNC: 33.7 GM/DL — SIGNIFICANT CHANGE UP (ref 32–36)
MCV RBC AUTO: 89.9 FL — SIGNIFICANT CHANGE UP (ref 80–100)
MONOCYTES # BLD AUTO: 0.9 K/UL — SIGNIFICANT CHANGE UP (ref 0–0.9)
MONOCYTES NFR BLD AUTO: 9.2 % — SIGNIFICANT CHANGE UP (ref 2–14)
NEUTROPHILS # BLD AUTO: 7.39 K/UL — SIGNIFICANT CHANGE UP (ref 1.8–7.4)
NEUTROPHILS NFR BLD AUTO: 75.6 % — SIGNIFICANT CHANGE UP (ref 43–77)
NRBC # BLD: 0 /100 WBCS — SIGNIFICANT CHANGE UP (ref 0–0)
NRBC # FLD: 0 K/UL — SIGNIFICANT CHANGE UP (ref 0–0)
PLATELET # BLD AUTO: 161 K/UL — SIGNIFICANT CHANGE UP (ref 150–400)
RBC # BLD: 3.96 M/UL — SIGNIFICANT CHANGE UP (ref 3.8–5.2)
RBC # FLD: 13.7 % — SIGNIFICANT CHANGE UP (ref 10.3–14.5)
RH IG SCN BLD-IMP: NEGATIVE — SIGNIFICANT CHANGE UP
T PALLIDUM AB TITR SER: NEGATIVE — SIGNIFICANT CHANGE UP
WBC # BLD: 9.78 K/UL — SIGNIFICANT CHANGE UP (ref 3.8–10.5)
WBC # FLD AUTO: 9.78 K/UL — SIGNIFICANT CHANGE UP (ref 3.8–10.5)

## 2023-08-16 PROCEDURE — 59510 CESAREAN DELIVERY: CPT | Mod: U9,GC

## 2023-08-16 PROCEDURE — 86077 PHYS BLOOD BANK SERV XMATCH: CPT

## 2023-08-16 DEVICE — SURGICEL SNOW 2 X 4": Type: IMPLANTABLE DEVICE | Status: FUNCTIONAL

## 2023-08-16 RX ORDER — IBUPROFEN 200 MG
1 TABLET ORAL
Qty: 0 | Refills: 0 | DISCHARGE
Start: 2023-08-16

## 2023-08-16 RX ORDER — TETANUS TOXOID, REDUCED DIPHTHERIA TOXOID AND ACELLULAR PERTUSSIS VACCINE, ADSORBED 5; 2.5; 8; 8; 2.5 [IU]/.5ML; [IU]/.5ML; UG/.5ML; UG/.5ML; UG/.5ML
0.5 SUSPENSION INTRAMUSCULAR ONCE
Refills: 0 | Status: DISCONTINUED | OUTPATIENT
Start: 2023-08-16 | End: 2023-08-19

## 2023-08-16 RX ORDER — CITRIC ACID/SODIUM CITRATE 300-500 MG
30 SOLUTION, ORAL ORAL ONCE
Refills: 0 | Status: COMPLETED | OUTPATIENT
Start: 2023-08-16 | End: 2023-08-16

## 2023-08-16 RX ORDER — ACETAMINOPHEN 500 MG
3 TABLET ORAL
Qty: 0 | Refills: 0 | DISCHARGE
Start: 2023-08-16

## 2023-08-16 RX ORDER — OXYCODONE HYDROCHLORIDE 5 MG/1
5 TABLET ORAL ONCE
Refills: 0 | Status: DISCONTINUED | OUTPATIENT
Start: 2023-08-16 | End: 2023-08-19

## 2023-08-16 RX ORDER — KETOROLAC TROMETHAMINE 30 MG/ML
30 SYRINGE (ML) INJECTION EVERY 6 HOURS
Refills: 0 | Status: DISCONTINUED | OUTPATIENT
Start: 2023-08-16 | End: 2023-08-17

## 2023-08-16 RX ORDER — FAMOTIDINE 10 MG/ML
20 INJECTION INTRAVENOUS ONCE
Refills: 0 | Status: COMPLETED | OUTPATIENT
Start: 2023-08-16 | End: 2023-08-16

## 2023-08-16 RX ORDER — DIPHENHYDRAMINE HCL 50 MG
25 CAPSULE ORAL EVERY 6 HOURS
Refills: 0 | Status: DISCONTINUED | OUTPATIENT
Start: 2023-08-16 | End: 2023-08-19

## 2023-08-16 RX ORDER — SODIUM CHLORIDE 9 MG/ML
1000 INJECTION, SOLUTION INTRAVENOUS ONCE
Refills: 0 | Status: COMPLETED | OUTPATIENT
Start: 2023-08-16 | End: 2023-08-16

## 2023-08-16 RX ORDER — MAGNESIUM HYDROXIDE 400 MG/1
30 TABLET, CHEWABLE ORAL
Refills: 0 | Status: DISCONTINUED | OUTPATIENT
Start: 2023-08-16 | End: 2023-08-19

## 2023-08-16 RX ORDER — HEPARIN SODIUM 5000 [USP'U]/ML
5000 INJECTION INTRAVENOUS; SUBCUTANEOUS EVERY 12 HOURS
Refills: 0 | Status: DISCONTINUED | OUTPATIENT
Start: 2023-08-16 | End: 2023-08-19

## 2023-08-16 RX ORDER — LANOLIN
1 OINTMENT (GRAM) TOPICAL EVERY 6 HOURS
Refills: 0 | Status: DISCONTINUED | OUTPATIENT
Start: 2023-08-16 | End: 2023-08-19

## 2023-08-16 RX ORDER — SIMETHICONE 80 MG/1
80 TABLET, CHEWABLE ORAL EVERY 4 HOURS
Refills: 0 | Status: DISCONTINUED | OUTPATIENT
Start: 2023-08-16 | End: 2023-08-19

## 2023-08-16 RX ORDER — ACETAMINOPHEN 500 MG
975 TABLET ORAL
Refills: 0 | Status: DISCONTINUED | OUTPATIENT
Start: 2023-08-16 | End: 2023-08-19

## 2023-08-16 RX ORDER — SODIUM CHLORIDE 9 MG/ML
1000 INJECTION, SOLUTION INTRAVENOUS
Refills: 0 | Status: DISCONTINUED | OUTPATIENT
Start: 2023-08-16 | End: 2023-08-19

## 2023-08-16 RX ORDER — ASPIRIN/CALCIUM CARB/MAGNESIUM 324 MG
1 TABLET ORAL
Refills: 0 | DISCHARGE

## 2023-08-16 RX ORDER — OXYCODONE HYDROCHLORIDE 5 MG/1
5 TABLET ORAL
Refills: 0 | Status: COMPLETED | OUTPATIENT
Start: 2023-08-16 | End: 2023-08-23

## 2023-08-16 RX ORDER — OXYTOCIN 10 UNIT/ML
41.67 VIAL (ML) INJECTION
Qty: 20 | Refills: 0 | Status: DISCONTINUED | OUTPATIENT
Start: 2023-08-16 | End: 2023-08-19

## 2023-08-16 RX ORDER — SODIUM CHLORIDE 9 MG/ML
250 INJECTION, SOLUTION INTRAVENOUS
Refills: 0 | Status: DISCONTINUED | OUTPATIENT
Start: 2023-08-16 | End: 2023-08-16

## 2023-08-16 RX ORDER — IBUPROFEN 200 MG
600 TABLET ORAL EVERY 6 HOURS
Refills: 0 | Status: COMPLETED | OUTPATIENT
Start: 2023-08-16 | End: 2024-07-14

## 2023-08-16 RX ADMIN — FAMOTIDINE 20 MILLIGRAM(S): 10 INJECTION INTRAVENOUS at 14:04

## 2023-08-16 RX ADMIN — HEPARIN SODIUM 5000 UNIT(S): 5000 INJECTION INTRAVENOUS; SUBCUTANEOUS at 21:46

## 2023-08-16 RX ADMIN — Medication 30 MILLILITER(S): at 14:04

## 2023-08-16 RX ADMIN — SODIUM CHLORIDE 75 MILLILITER(S): 9 INJECTION, SOLUTION INTRAVENOUS at 17:25

## 2023-08-16 RX ADMIN — Medication 125 MILLIUNIT(S)/MIN: at 17:24

## 2023-08-16 RX ADMIN — SODIUM CHLORIDE 2000 MILLILITER(S): 9 INJECTION, SOLUTION INTRAVENOUS at 14:05

## 2023-08-16 RX ADMIN — Medication 975 MILLIGRAM(S): at 23:19

## 2023-08-16 NOTE — DISCHARGE NOTE OB - MEDICATION SUMMARY - MEDICATIONS TO STOP TAKING
I spoke with patient on the phone and called in script for erythromycin eye ointment to [facility] [address] per her request  Aware to follow up with BENEDICT Cota RN  05/31/22 [id number]
I will STOP taking the medications listed below when I get home from the hospital:    aspirin 81 mg oral tablet  -- 1 by mouth every other day

## 2023-08-16 NOTE — OB PROVIDER H&P - HISTORY OF PRESENT ILLNESS
34yo female  EDC:2023  presents @ 39 wks for schedule pLTCS.  +AP course IVF pregnancy, Breech, GDMA1, macrosomia .  h/o Placenta Previa - resolved by 30wks  Denies SOB,fever, chills or cough.   Denies VB,ROM or UCs today.  +FM    A/P course  Prenatal Care Provider:  MD Gustavo  Last Utrasound: 8/15/2023: EFW 97% HC>99%, AC=99%                       - sono 2023- EFW 3809gms  GBS neg (2023)  Rubella immune(2023  Blood Type -O neg ( 2023)  Hep B- NR- (2023)  VDRL- neg (2023)  HIV neg- (2023)

## 2023-08-16 NOTE — OB PROVIDER H&P - NSLOWDOSEASPIRINFREQUENCY_OBGYN_ALL_OB
Anesthesia Post Evaluation    Patient: Nigel SCHMID [facility]    Procedure(s) Performed: Procedure(s) (LRB):  COLONOSCOPY (N/A)    Final Anesthesia Type: MAC    Patient location during evaluation: PACU  Patient participation: Yes- Able to Participate  Level of consciousness: awake and alert and awake  Post-procedure vital signs: reviewed and stable  Pain management: adequate  Airway patency: patent    PONV status at discharge: No PONV  Anesthetic complications: no      Cardiovascular status: blood pressure returned to baseline  Respiratory status: unassisted, spontaneous ventilation and room air  Hydration status: euvolemic  Follow-up not needed.          Vitals Value Taken Time   /83 10/06/20 0837   Temp 36.5 °C (97.7 °F) 10/06/20 0837   Pulse 73 10/06/20 0837   Resp 18 10/06/20 0837   SpO2 100 % 10/06/20 0837         No case tracking events are documented in the log.      Pain/Jacki Score: No data recorded       Frequently used (Daily)

## 2023-08-16 NOTE — OB PROVIDER DELIVERY SUMMARY - NSLOWPPHRISK_OBGYN_A_OB
No previous uterine incision/[place] Pregnancy/Less than or equal to 4 previous vaginal births/No known bleeding disorder/No history of postpartum hemorrhage

## 2023-08-16 NOTE — OB PROVIDER H&P - ASSESSMENT
Admit to LFRANCINE Chen MD-Service  Dx: scheduled pLTCS  Dx: Breech/Macrosomia  NPO  routine labs  EFM/TOCO  Bedside TLTAS  anesthesia consult  Willow Carcamo PAC, [facility]  08-16-23 @ 12:45

## 2023-08-16 NOTE — DISCHARGE NOTE OB - CARE PLAN
1 Principal Discharge DX:	 delivery delivered  Assessment and plan of treatment:	Nothing per vagina  and no heavy lifting for 6wks

## 2023-08-16 NOTE — OB NEONATOLOGY/PEDIATRICIAN DELIVERY SUMMARY - NSPEDSNEONOTESA_OBGYN_ALL_OB_FT
39wk male born via CS to a 34 y/o  blood type O- mother. Maternal history of gestational diabetes. PNL -/-/NR/I, GBS - on . Patient received Rhogam on 06/15. AROM at delivery with clear fluids. Baby emerged stunned. Infant was brought to radiant warmer and warmed, dried, stimulated and suctioned and clinical status improved by 1 minute of life. HR>100, normal respiratory effort. with APGARS of 7/9 .  Mom plans to initiate breastfeeding, declines Hep B vaccine and consents circ.    BW: 3770 g  : 23   TOB: 15:14 39wk male born via CS to a 32 y/o  blood type O- mother. Maternal history of gestational diabetes. PNL -/-/NR/I, GBS - on . Patient received Rhogam on 06/15. AROM at delivery with clear fluids. Baby emerged stunned. Infant was brought to radiant warmer and warmed, dried, stimulated and suctioned and clinical status improved by 1 minute of life. HR>100, normal respiratory effort. with APGARS of 7/9 .  Mom plans to initiate breastfeeding, declines Hep B vaccine and consents circ.    BW: 3770 g  : 23   TOB: 15:14    Physical Exam:  Gen: NAD, +grimace  HEENT: anterior fontanel open soft and flat, no cleft lip/palate, ears normal set, no ear pits or tags. no lesions in mouth/throat, nares clinically patent  Resp: no increased work of breathing, good air entry b/l, clear to auscultation bilaterally  Cardio: Normal S1/S2, regular rate and rhythm, no murmurs, rubs or gallops  Abd: soft, non tender, non distended, + bowel sounds, umbilical cord with 3 vessels  Neuro: +grasp/suck/bk, normal tone  Extremities: negative llanes and ortolani, moving all extremities, full range of motion x 4, no crepitus  Skin: pink, warm  Genitals: Normal male anatomy, testicles palpable in scrotum b/l, Antoni 1, anus patent 39wk male born via CS to a 34 y/o  blood type O- mother. Maternal history of gestational diabetes. PNL -/-/NR/I, GBS - on . Patient received Rhogam on 06/15. AROM at delivery with clear fluids. Baby emerged stunned. Infant was brought to radiant warmer and warmed, dried, stimulated and suctioned and clinical status improved by 1 minute of life. NICU resuscitation team was initially called given that the baby was stunned but given fast improvement it was cancelled. HR>100, normal respiratory effort. with APGARS of 7/9 .  Mom plans to initiate breastfeeding, declines Hep B vaccine and consents circ.    BW: 3770 g  : 23   TOB: 15:14    Physical Exam:  Gen: NAD, +grimace  HEENT: anterior fontanel open soft and flat, no cleft lip/palate, ears normal set, no ear pits or tags. no lesions in mouth/throat, nares clinically patent  Resp: no increased work of breathing, good air entry b/l, clear to auscultation bilaterally  Cardio: Normal S1/S2, regular rate and rhythm, no murmurs, rubs or gallops  Abd: soft, non tender, non distended, + bowel sounds, umbilical cord with 3 vessels  Neuro: +grasp/suck/bk, normal tone  Extremities: negative llanes and ortolani, moving all extremities, full range of motion x 4, no crepitus  Skin: pink, warm  Genitals: Normal male anatomy, testicles palpable in scrotum b/l, Antoni 1, anus patent

## 2023-08-16 NOTE — OB PROVIDER H&P - NSHPPHYSICALEXAM_GEN_ALL_CORE
T(C): --  HR: 72 (08-16-23 @ 11:59) (72 - 72)  BP: 110/75 (08-16-23 @ 11:59) (110/75 - 110/75)  RR: --  SpO2: --    A&Ox3,, FS :79  Heart: RRR, S1&S2, no S3  Lungs: Clear bilateral to auscultation, good inspiratory /expiratory effort              no rhonchi, no rales  Abd: soft, Gravid, TOCO in place           no scar  Bedside TLTAS: Breech/maternal left/ footling breech  EFM:  115bpm/moderate variabilty/+accelerations/no decelerations/CAT 1  TOCO:  irreg UC  Ext:  FROM /bilateral  1+ LE edema /no edema  Neuro: grossly intact

## 2023-08-16 NOTE — OB RN PATIENT PROFILE - SURGICAL SITE INCISION
ED Time Seen By Provider Entered On:  3/18/2018 17:52     Performed On:  3/18/2018 17:52  by Michael Carcamo MD               Time Seen By Provider   Time Seen by Provider :   3/18/2018 17:30    Michael Carcamo MD. - 3/18/2018 17:52            no

## 2023-08-16 NOTE — OB PROVIDER DELIVERY SUMMARY - NSSELHIDDEN_OBGYN_ALL_OB_FT
[NS_DeliveryAttending1_OBGYN_ALL_OB_FT:[id number]==],[NS_DeliveryAssist1_OBGYN_ALL_OB_FT:[id number]]

## 2023-08-16 NOTE — OB RN INTRAOPERATIVE NOTE - NSSELHIDDEN_OBGYN_ALL_OB_FT
[NS_DeliveryAttending1_OBGYN_ALL_OB_FT:[id number]==],[NS_DeliveryAssist1_OBGYN_ALL_OB_FT:[id number]=],[NS_DeliveryRN_OBGYN_ALL_OB_FT:[id number]]

## 2023-08-16 NOTE — DISCHARGE NOTE OB - MEDICATION SUMMARY - MEDICATIONS TO TAKE
I will START or STAY ON the medications listed below when I get home from the hospital:    ibuprofen 600 mg oral tablet  -- 1 tab(s) by mouth every 6 hours as needed for  moderate pain  -- Indication: For  delivery    acetaminophen 325 mg oral tablet  -- 3 tab(s) by mouth every 8 hours as needed for  moderate pain  -- Indication: For  delivery

## 2023-08-16 NOTE — OB RN DELIVERY SUMMARY - NSSELHIDDEN_OBGYN_ALL_OB_FT
[NS_DeliveryAttending1_OBGYN_ALL_OB_FT:[id number]==],[NS_DeliveryAssist1_OBGYN_ALL_OB_FT:[id number]],[NS_DeliveryRN_OBGYN_ALL_OB_FT:[id number]]

## 2023-08-16 NOTE — OB PROVIDER H&P - NS_OBGYNHISTORY_OBGYN_ALL_OB_FT
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

## 2023-08-16 NOTE — DISCHARGE NOTE OB - HOSPITAL COURSE
Pt underwent a primary C/S for breech presentation at term.  Delivering a viable male infant. Uncomplicated postop care

## 2023-08-16 NOTE — OB RN DELIVERY SUMMARY - NS_GENERALBABYACOMMENTA_OBGYN_ALL_OB_FT
Pediatrician present at delivery.  delivered without spontaneous cry and poor tone and color. NICU resusitatiion team called to OR. [place] responded with tactile stimulation. NICU resus team cancelled and Pediatric fellow came to OR and assessed . No further intervention required.

## 2023-08-16 NOTE — DISCHARGE NOTE OB - PATIENT PORTAL LINK FT
You can access the FollowMyHealth Patient Portal offered by [facility] by registering at the following website: [email or web address]. By joining [employer]’s FollowMyHealth portal, you will also be able to view your health information using other applications (apps) compatible with our system.

## 2023-08-16 NOTE — DISCHARGE NOTE OB - CARE PROVIDER_API CALL
Brittany Chen  Obstetrics and Gynecology  [address] [place] [address]  Phone: [phone]  Fax: [phone]  Follow Up Time:

## 2023-08-16 NOTE — OB PROVIDER DELIVERY SUMMARY - NSPROVIDERDELIVERYNOTE_OBGYN_ALL_OB_FT
Scheduled pLTCS for breech presentation and suspected macrosomia  Viable breech male infant, apgars 7/9, weight 3770 g  Hysterotomy closed in 2 layers using caprosyn  Surgicel powder placed over hysterotomy and muscle  Grossly normal uterus, tubes, and ovaries  Abdomen closed in standard fashion  Pt and infant to recovery in stable condition  Dictation #  QBL: 913 mL        IVF: 1675 mL        UOP: 675 mL    Chasidy Larson PGY2 Scheduled pLTCS for breech presentation and suspected macrosomia  Viable breech male infant, apgars 7/9, weight 3770 g  Hysterotomy closed in 2 layers using caprosyn  Surgicel powder placed over hysterotomy and muscle  Grossly normal uterus, tubes, and ovaries  Abdomen closed in standard fashion  Pt and infant to recovery in stable condition  Dictation #[id number]  QBL: 913 mL        IVF: 1675 mL        UOP: 675 mL    Chasidy Larson PGY2

## 2023-08-16 NOTE — OB RN PATIENT PROFILE - FALL HARM RISK - ATTEMPT OOB
V-Y Flap Text: The defect edges were debeveled with a #15 scalpel blade.  Given the location of the defect, shape of the defect and the proximity to free margins a V-Y flap was deemed most appropriate.  Using a sterile surgical marker, an appropriate advancement flap was drawn incorporating the defect and placing the expected incisions within the relaxed skin tension lines where possible.    The area thus outlined was incised deep to adipose tissue with a #15 scalpel blade.  The skin margins were undermined to an appropriate distance in all directions utilizing iris scissors. No

## 2023-08-16 NOTE — OB RN PATIENT PROFILE - FALL HARM RISK - UNIVERSAL INTERVENTIONS
Bed in lowest position, wheels locked, appropriate side rails in place/Call bell, personal items and telephone in reach/Instruct patient to call for assistance before getting out of bed or chair/Non-slip footwear when patient is out of bed/[place] to call system/Physically safe environment - no spills, clutter or unnecessary equipment/Purposeful Proactive Rounding/Room/bathroom lighting operational, light cord in reach

## 2023-08-17 LAB
BASOPHILS # BLD AUTO: 0.04 K/UL — SIGNIFICANT CHANGE UP (ref 0–0.2)
BASOPHILS NFR BLD AUTO: 0.2 % — SIGNIFICANT CHANGE UP (ref 0–2)
EOSINOPHIL # BLD AUTO: 0.03 K/UL — SIGNIFICANT CHANGE UP (ref 0–0.5)
EOSINOPHIL NFR BLD AUTO: 0.2 % — SIGNIFICANT CHANGE UP (ref 0–6)
HCT VFR BLD CALC: 28.3 % — LOW (ref 34.5–45)
HGB BLD-MCNC: 9.5 G/DL — LOW (ref 11.5–15.5)
IANC: 13.08 K/UL — HIGH (ref 1.8–7.4)
IMM GRANULOCYTES NFR BLD AUTO: 0.6 % — SIGNIFICANT CHANGE UP (ref 0–0.9)
KLEIHAUER-BETKE CALCULATION: 0.04 % — SIGNIFICANT CHANGE UP
LYMPHOCYTES # BLD AUTO: 1.8 K/UL — SIGNIFICANT CHANGE UP (ref 1–3.3)
LYMPHOCYTES # BLD AUTO: 10.8 % — LOW (ref 13–44)
MCHC RBC-ENTMCNC: 30.4 PG — SIGNIFICANT CHANGE UP (ref 27–34)
MCHC RBC-ENTMCNC: 33.6 GM/DL — SIGNIFICANT CHANGE UP (ref 32–36)
MCV RBC AUTO: 90.7 FL — SIGNIFICANT CHANGE UP (ref 80–100)
MONOCYTES # BLD AUTO: 1.66 K/UL — HIGH (ref 0–0.9)
MONOCYTES NFR BLD AUTO: 9.9 % — SIGNIFICANT CHANGE UP (ref 2–14)
NEUTROPHILS # BLD AUTO: 13.08 K/UL — HIGH (ref 1.8–7.4)
NEUTROPHILS NFR BLD AUTO: 78.3 % — HIGH (ref 43–77)
NRBC # BLD: 0 /100 WBCS — SIGNIFICANT CHANGE UP (ref 0–0)
NRBC # FLD: 0 K/UL — SIGNIFICANT CHANGE UP (ref 0–0)
PLATELET # BLD AUTO: 156 K/UL — SIGNIFICANT CHANGE UP (ref 150–400)
RBC # BLD: 3.12 M/UL — LOW (ref 3.8–5.2)
RBC # FLD: 13.8 % — SIGNIFICANT CHANGE UP (ref 10.3–14.5)
WBC # BLD: 16.71 K/UL — HIGH (ref 3.8–10.5)
WBC # FLD AUTO: 16.71 K/UL — HIGH (ref 3.8–10.5)

## 2023-08-17 RX ORDER — IBUPROFEN 200 MG
600 TABLET ORAL EVERY 6 HOURS
Refills: 0 | Status: DISCONTINUED | OUTPATIENT
Start: 2023-08-17 | End: 2023-08-19

## 2023-08-17 RX ORDER — FERROUS SULFATE 325(65) MG
325 TABLET ORAL
Refills: 0 | Status: DISCONTINUED | OUTPATIENT
Start: 2023-08-17 | End: 2023-08-19

## 2023-08-17 RX ORDER — OXYCODONE HYDROCHLORIDE 5 MG/1
5 TABLET ORAL
Refills: 0 | Status: DISCONTINUED | OUTPATIENT
Start: 2023-08-17 | End: 2023-08-19

## 2023-08-17 RX ORDER — ASCORBIC ACID 60 MG
500 TABLET,CHEWABLE ORAL DAILY
Refills: 0 | Status: DISCONTINUED | OUTPATIENT
Start: 2023-08-17 | End: 2023-08-19

## 2023-08-17 RX ORDER — SENNA PLUS 8.6 MG/1
2 TABLET ORAL AT BEDTIME
Refills: 0 | Status: DISCONTINUED | OUTPATIENT
Start: 2023-08-17 | End: 2023-08-19

## 2023-08-17 RX ADMIN — Medication 975 MILLIGRAM(S): at 09:38

## 2023-08-17 RX ADMIN — OXYCODONE HYDROCHLORIDE 5 MILLIGRAM(S): 5 TABLET ORAL at 21:30

## 2023-08-17 RX ADMIN — Medication 975 MILLIGRAM(S): at 22:00

## 2023-08-17 RX ADMIN — Medication 975 MILLIGRAM(S): at 10:30

## 2023-08-17 RX ADMIN — OXYCODONE HYDROCHLORIDE 5 MILLIGRAM(S): 5 TABLET ORAL at 22:00

## 2023-08-17 RX ADMIN — Medication 975 MILLIGRAM(S): at 21:10

## 2023-08-17 RX ADMIN — Medication 975 MILLIGRAM(S): at 17:25

## 2023-08-17 RX ADMIN — Medication 30 MILLIGRAM(S): at 05:27

## 2023-08-17 RX ADMIN — Medication 500 MILLIGRAM(S): at 12:49

## 2023-08-17 RX ADMIN — Medication 30 MILLIGRAM(S): at 06:14

## 2023-08-17 RX ADMIN — HEPARIN SODIUM 5000 UNIT(S): 5000 INJECTION INTRAVENOUS; SUBCUTANEOUS at 21:10

## 2023-08-17 RX ADMIN — Medication 325 MILLIGRAM(S): at 18:28

## 2023-08-17 RX ADMIN — Medication 1 TABLET(S): at 12:48

## 2023-08-17 RX ADMIN — Medication 975 MILLIGRAM(S): at 16:43

## 2023-08-17 RX ADMIN — HEPARIN SODIUM 5000 UNIT(S): 5000 INJECTION INTRAVENOUS; SUBCUTANEOUS at 09:38

## 2023-08-17 RX ADMIN — SIMETHICONE 80 MILLIGRAM(S): 80 TABLET, CHEWABLE ORAL at 21:30

## 2023-08-17 RX ADMIN — Medication 600 MILLIGRAM(S): at 18:28

## 2023-08-17 RX ADMIN — SENNA PLUS 2 TABLET(S): 8.6 TABLET ORAL at 21:11

## 2023-08-17 RX ADMIN — Medication 975 MILLIGRAM(S): at 00:19

## 2023-08-17 NOTE — LACTATION INITIAL EVALUATION - LACTATION INTERVENTIONS
assisted to put baby to both breasts in football hold position with deep latch and baby noted to suck with stimulation/initiate/review safe skin-to-skin/initiate/review hand expression/initiate/review techniques for position and latch/initiate/review breast massage/compression/reviewed components of an effective feeding and at least 8 effective feedings per day required/reviewed importance of monitoring infant diapers, the breastfeeding log, and minimum output each day/reviewed risks of artificial nipples/reviewed benefits and recommendations for rooming in/reviewed feeding on demand/by cue at least 8 times a day/reviewed indications of inadequate milk transfer that would require supplementation

## 2023-08-17 NOTE — PROGRESS NOTE ADULT - ASSESSMENT
A/P: 34yo POD#1 s/p LTCS.  Patient is stable and doing well post-operatively.    - Continue regular diet  - Increase ambulation  - Continue motrin, tylenol, oxycodone PRN for pain control.    - f/u AM CBC- still pending    Keenan Zacarias MD PGY1

## 2023-08-18 RX ADMIN — Medication 975 MILLIGRAM(S): at 04:00

## 2023-08-18 RX ADMIN — HEPARIN SODIUM 5000 UNIT(S): 5000 INJECTION INTRAVENOUS; SUBCUTANEOUS at 22:02

## 2023-08-18 RX ADMIN — Medication 600 MILLIGRAM(S): at 13:00

## 2023-08-18 RX ADMIN — Medication 600 MILLIGRAM(S): at 01:00

## 2023-08-18 RX ADMIN — Medication 975 MILLIGRAM(S): at 22:03

## 2023-08-18 RX ADMIN — Medication 1 TABLET(S): at 12:13

## 2023-08-18 RX ADMIN — Medication 500 MILLIGRAM(S): at 18:41

## 2023-08-18 RX ADMIN — Medication 600 MILLIGRAM(S): at 06:13

## 2023-08-18 RX ADMIN — Medication 600 MILLIGRAM(S): at 00:26

## 2023-08-18 RX ADMIN — Medication 975 MILLIGRAM(S): at 09:00

## 2023-08-18 RX ADMIN — Medication 600 MILLIGRAM(S): at 12:12

## 2023-08-18 RX ADMIN — Medication 975 MILLIGRAM(S): at 03:06

## 2023-08-18 RX ADMIN — SIMETHICONE 80 MILLIGRAM(S): 80 TABLET, CHEWABLE ORAL at 09:00

## 2023-08-18 RX ADMIN — Medication 975 MILLIGRAM(S): at 16:55

## 2023-08-18 RX ADMIN — HEPARIN SODIUM 5000 UNIT(S): 5000 INJECTION INTRAVENOUS; SUBCUTANEOUS at 09:00

## 2023-08-18 RX ADMIN — Medication 325 MILLIGRAM(S): at 06:13

## 2023-08-18 RX ADMIN — Medication 600 MILLIGRAM(S): at 18:41

## 2023-08-18 RX ADMIN — SENNA PLUS 2 TABLET(S): 8.6 TABLET ORAL at 22:03

## 2023-08-18 RX ADMIN — Medication 325 MILLIGRAM(S): at 18:41

## 2023-08-18 NOTE — PROGRESS NOTE ADULT - ATTENDING COMMENTS
Patient seen and examined at bedside, agree with assessment and plan below.  S/p Rhogam for Rh negative  Continue postpartum care, anticipate DC home tomorrow    [place] MD
OB Attending Covering  Pt S/P C/S POD#1  Pt without any complaints; +flatus; no BM    VSS, afebrile    PE -     Ab - soft/nt/nd  Incision - healing well/no edema/no erythema/no purulence  Ext - No C/C/E, no calf pain                          9.5    16.71 )-----------( 156      ( 17 Aug 2023 05:00 )             28.3     A/P Pt S/P C/S POD#1.  Pt in stable condition.    Will:    1) Ambulation encouraged  2) Cont with post-op care  3) Discharge planning    Will follow    ROSEANNA Stevens

## 2023-08-19 VITALS
OXYGEN SATURATION: 100 % | TEMPERATURE: 98 F | RESPIRATION RATE: 17 BRPM | SYSTOLIC BLOOD PRESSURE: 111 MMHG | HEART RATE: 95 BPM | DIASTOLIC BLOOD PRESSURE: 79 MMHG

## 2023-08-19 RX ORDER — DIBUCAINE 1 %
1 OINTMENT (GRAM) RECTAL
Refills: 0 | Status: DISCONTINUED | OUTPATIENT
Start: 2023-08-19 | End: 2023-08-19

## 2023-08-19 RX ADMIN — HEPARIN SODIUM 5000 UNIT(S): 5000 INJECTION INTRAVENOUS; SUBCUTANEOUS at 10:07

## 2023-08-19 RX ADMIN — MAGNESIUM HYDROXIDE 30 MILLILITER(S): 400 TABLET, CHEWABLE ORAL at 08:53

## 2023-08-19 RX ADMIN — Medication 600 MILLIGRAM(S): at 05:19

## 2023-08-19 RX ADMIN — Medication 975 MILLIGRAM(S): at 09:53

## 2023-08-19 RX ADMIN — Medication 975 MILLIGRAM(S): at 08:53

## 2023-08-19 RX ADMIN — Medication 325 MILLIGRAM(S): at 10:08

## 2023-08-19 NOTE — PROGRESS NOTE ADULT - SUBJECTIVE AND OBJECTIVE BOX
OB Progress Note: pTLCS, POD#2    S: 32yo POD#2 s/p pLTCS. Pain is well controlled. She is tolerating a regular diet and passing flatus. She is voiding spontaneously, and ambulating without difficulty. Denies CP/SOB. Denies lightheadedness/dizziness. Denies N/V.    O:  Vitals:  Vital Signs Last 24 Hrs  T(C): 36.7 (19 Aug 2023 06:30), Max: 37.3 (18 Aug 2023 14:00)  T(F): 98 (19 Aug 2023 06:30), Max: 99.1 (18 Aug 2023 14:00)  HR: 85 (19 Aug 2023 06:30) (76 - 85)  BP: 116/75 (19 Aug 2023 06:30) (109/71 - 116/75)  BP(mean): --  RR: 17 (19 Aug 2023 06:30) (17 - 18)  SpO2: 99% (19 Aug 2023 06:30) (99% - 100%)    Parameters below as of 19 Aug 2023 06:30  Patient On (Oxygen Delivery Method): room air        MEDICATIONS  (STANDING):  acetaminophen     Tablet .. 975 milliGRAM(s) Oral <User Schedule>  ascorbic acid 500 milliGRAM(s) Oral daily  diphtheria/tetanus/pertussis (acellular) Vaccine (Adacel) 0.5 milliLiter(s) IntraMuscular once  ferrous    sulfate 325 milliGRAM(s) Oral two times a day  heparin   Injectable 5000 Unit(s) SubCutaneous every 12 hours  ibuprofen  Tablet. 600 milliGRAM(s) Oral every 6 hours  lactated ringers. 1000 milliLiter(s) (125 mL/Hr) IV Continuous <Continuous>  lactated ringers. 1000 milliLiter(s) (125 mL/Hr) IV Continuous <Continuous>  lactated ringers. 1000 milliLiter(s) (75 mL/Hr) IV Continuous <Continuous>  oxytocin Infusion 41.667 milliUNIT(s)/Min (125 mL/Hr) IV Continuous <Continuous>  prenatal multivitamin 1 Tablet(s) Oral daily  senna 2 Tablet(s) Oral at bedtime      MEDICATIONS  (PRN):  dibucaine 1% Ointment 1 Application(s) Topical four times a day PRN pain  diphenhydrAMINE 25 milliGRAM(s) Oral every 6 hours PRN Pruritus  lanolin Ointment 1 Application(s) Topical every 6 hours PRN Sore Nipples  magnesium hydroxide Suspension 30 milliLiter(s) Oral two times a day PRN Constipation  oxyCODONE    IR 5 milliGRAM(s) Oral once PRN Moderate to Severe Pain (4-10)  oxyCODONE    IR 5 milliGRAM(s) Oral every 3 hours PRN Moderate to Severe Pain (4-10)  simethicone 80 milliGRAM(s) Chew every 4 hours PRN Gas      Labs:  Blood type: O Negative  Rubella IgG: RPR: Negative                          9.5<L>   16.71<H> >-----------< 156    ( 08-17 @ 05:00 )             28.3<L>                        12.0   9.78 >-----------< 161    ( 08-16 @ 12:34 )             35.6    PE:  General: NAD  Abdomen: Soft, appropriately tender, incision c/d/i, dermabond   Extremities: No erythema, no pitting edema    A/P: 32yo  POD#2 s/p pLTCS.  Patient is stable and doing well post-operatively.    - Continue regular diet.  - Increase ambulation.  - Continue motrin, tylenol, oxycodone PRN for pain control.  -Reviewed pain management regiment  -Bleeding precautions  -Reviewed incisional care  --soap and water  --keep area clean and dry   -return to OB in 2 weeks for incisional check   -Reviewed signs and symptoms related to infecton   -Nothing per vagina x6 weeks   -Report to OB in 6 weeks or sooner if indicated     ANDREINA Geiger NP
S: 32yo POD#2 s/p pLTCS for breech & suspected macrosomia. Pain is well controlled. She is tolerating a regular diet and passing flatus. She is voiding spontaneously, and ambulating without difficulty. Denies CP/SOB. Denies lightheadedness/dizziness. Denies N/V.    O:  Vitals:  Vital Signs Last 24 Hrs  T(C): 36.3 (18 Aug 2023 06:10), Max: 36.7 (17 Aug 2023 21:56)  T(F): 97.3 (18 Aug 2023 06:10), Max: 98 (17 Aug 2023 21:56)  HR: 66 (18 Aug 2023 06:10) (66 - 77)  BP: 104/60 (18 Aug 2023 06:10) (104/60 - 104/63)  BP(mean): --  RR: 17 (18 Aug 2023 06:10) (17 - 17)  SpO2: 97% (18 Aug 2023 06:10) (97% - 98%)    Parameters below as of 18 Aug 2023 06:10  Patient On (Oxygen Delivery Method): room air        MEDICATIONS  (STANDING):  acetaminophen     Tablet .. 975 milliGRAM(s) Oral <User Schedule>  ascorbic acid 500 milliGRAM(s) Oral daily  diphtheria/tetanus/pertussis (acellular) Vaccine (Adacel) 0.5 milliLiter(s) IntraMuscular once  ferrous    sulfate 325 milliGRAM(s) Oral two times a day  heparin   Injectable 5000 Unit(s) SubCutaneous every 12 hours  ibuprofen  Tablet. 600 milliGRAM(s) Oral every 6 hours  lactated ringers. 1000 milliLiter(s) (125 mL/Hr) IV Continuous <Continuous>  lactated ringers. 1000 milliLiter(s) (125 mL/Hr) IV Continuous <Continuous>  lactated ringers. 1000 milliLiter(s) (75 mL/Hr) IV Continuous <Continuous>  oxytocin Infusion 41.667 milliUNIT(s)/Min (125 mL/Hr) IV Continuous <Continuous>  prenatal multivitamin 1 Tablet(s) Oral daily  senna 2 Tablet(s) Oral at bedtime      MEDICATIONS  (PRN):  diphenhydrAMINE 25 milliGRAM(s) Oral every 6 hours PRN Pruritus  lanolin Ointment 1 Application(s) Topical every 6 hours PRN Sore Nipples  magnesium hydroxide Suspension 30 milliLiter(s) Oral two times a day PRN Constipation  oxyCODONE    IR 5 milliGRAM(s) Oral every 3 hours PRN Moderate to Severe Pain (4-10)  oxyCODONE    IR 5 milliGRAM(s) Oral once PRN Moderate to Severe Pain (4-10)  simethicone 80 milliGRAM(s) Chew every 4 hours PRN Gas      Labs:  Blood type: O Negative  Rubella IgG: RPR: Negative                          9.5<L>   16.71<H> >-----------< 156    ( 08-17 @ 05:00 )             28.3<L>                        12.0   9.78 >-----------< 161    ( 08-16 @ 12:34 )             35.6      PE:  General: NAD  Abdomen: Soft, appropriately tender, incision c/d/i with dermabond  Lochia: WNL  Extremities: No calf tenderness    A/P: 32yo POD#2 s/p pLTCS.  Patient is stable and doing well post-operatively.    - Continue regular diet.  - Encouraged use of abdominal binder  - Increase ambulation.  - Continue motrin, tylenol, oxycodone PRN for pain control.    - Discharge planning    Carissa VARELA
32yo POD#1 s/p LTCS . Pain well controlled, tolerating regular diet, not yet passing flatus, ambulating without difficulty, voiding spontaneously. Denies heavy vaginal bleeding, N/V, CP/SOB/lightheadedness/dizziness.     O:   Vital Signs Last 24 Hrs  T(C): 36.5 (17 Aug 2023 05:15), Max: 37.1 (16 Aug 2023 13:11)  T(F): 97.7 (17 Aug 2023 05:15), Max: 98.8 (16 Aug 2023 13:11)  HR: 68 (17 Aug 2023 05:15) (55 - 72)  BP: 101/64 (17 Aug 2023 05:15) (94/52 - 114/77)  BP(mean): 82 (16 Aug 2023 19:00) (71 - 86)  RR: 18 (17 Aug 2023 05:15) (12 - 22)  SpO2: 100% (17 Aug 2023 05:15) (97% - 100%)    Parameters below as of 17 Aug 2023 05:15  Patient On (Oxygen Delivery Method): room air        Labs:  Blood type: O Negative  Rubella IgG: RPR: Negative                          9.5<L>   16.71<H> >-----------< 156    ( 08-17 @ 05:00 )             28.3<L>                        12.0   9.78 >-----------< 161    ( 08-16 @ 12:34 )             35.6                  PE:  General: NAD  CV: RRR  Resp: CTAB  Abdomen: Mildly distended, appropriately tender, Fundus firm, incision c/d/i.  : Nl lochia  Extremities: No erythema, no pitting edema

## 2023-08-22 ENCOUNTER — OUTPATIENT (OUTPATIENT)
Dept: INPATIENT UNIT | Facility: HOSPITAL | Age: 33
LOS: 1 days | Discharge: HOME | End: 2023-08-22
Payer: COMMERCIAL

## 2023-08-22 DIAGNOSIS — Z98.890 OTHER SPECIFIED POSTPROCEDURAL STATES: Chronic | ICD-10-CM

## 2023-08-22 DIAGNOSIS — O26.899 OTHER SPECIFIED PREGNANCY RELATED CONDITIONS, UNSPECIFIED TRIMESTER: ICD-10-CM

## 2023-08-22 LAB
ALBUMIN SERPL ELPH-MCNC: 3.2 G/DL — LOW (ref 3.3–5)
ALP SERPL-CCNC: 100 U/L — SIGNIFICANT CHANGE UP (ref 40–120)
ALT FLD-CCNC: 22 U/L — SIGNIFICANT CHANGE UP (ref 4–33)
ANION GAP SERPL CALC-SCNC: 14 MMOL/L — SIGNIFICANT CHANGE UP (ref 7–14)
APTT BLD: 29 SEC — SIGNIFICANT CHANGE UP (ref 24.5–35.6)
AST SERPL-CCNC: 22 U/L — SIGNIFICANT CHANGE UP (ref 4–32)
BASOPHILS # BLD AUTO: 0.03 K/UL — SIGNIFICANT CHANGE UP (ref 0–0.2)
BASOPHILS NFR BLD AUTO: 0.3 % — SIGNIFICANT CHANGE UP (ref 0–2)
BILIRUB SERPL-MCNC: <0.2 MG/DL — SIGNIFICANT CHANGE UP (ref 0.2–1.2)
BUN SERPL-MCNC: 11 MG/DL — SIGNIFICANT CHANGE UP (ref 7–23)
CALCIUM SERPL-MCNC: 8.4 MG/DL — SIGNIFICANT CHANGE UP (ref 8.4–10.5)
CHLORIDE SERPL-SCNC: 102 MMOL/L — SIGNIFICANT CHANGE UP (ref 98–107)
CO2 SERPL-SCNC: 19 MMOL/L — LOW (ref 22–31)
CREAT SERPL-MCNC: 0.51 MG/DL — SIGNIFICANT CHANGE UP (ref 0.5–1.3)
EGFR: 126 ML/MIN/1.73M2 — SIGNIFICANT CHANGE UP
EOSINOPHIL # BLD AUTO: 0.19 K/UL — SIGNIFICANT CHANGE UP (ref 0–0.5)
EOSINOPHIL NFR BLD AUTO: 2.1 % — SIGNIFICANT CHANGE UP (ref 0–6)
FIBRINOGEN PPP-MCNC: 383 MG/DL — SIGNIFICANT CHANGE UP (ref 200–465)
GLUCOSE SERPL-MCNC: 124 MG/DL — HIGH (ref 70–99)
HCT VFR BLD CALC: 29.7 % — LOW (ref 34.5–45)
HGB BLD-MCNC: 9.8 G/DL — LOW (ref 11.5–15.5)
IANC: 6.45 K/UL — SIGNIFICANT CHANGE UP (ref 1.8–7.4)
IMM GRANULOCYTES NFR BLD AUTO: 0.7 % — SIGNIFICANT CHANGE UP (ref 0–0.9)
INR BLD: <0.9 RATIO — SIGNIFICANT CHANGE UP (ref 0.85–1.18)
LDH SERPL L TO P-CCNC: 182 U/L — SIGNIFICANT CHANGE UP (ref 135–225)
LYMPHOCYTES # BLD AUTO: 1.82 K/UL — SIGNIFICANT CHANGE UP (ref 1–3.3)
LYMPHOCYTES # BLD AUTO: 20 % — SIGNIFICANT CHANGE UP (ref 13–44)
MCHC RBC-ENTMCNC: 30.8 PG — SIGNIFICANT CHANGE UP (ref 27–34)
MCHC RBC-ENTMCNC: 33 GM/DL — SIGNIFICANT CHANGE UP (ref 32–36)
MCV RBC AUTO: 93.4 FL — SIGNIFICANT CHANGE UP (ref 80–100)
MONOCYTES # BLD AUTO: 0.57 K/UL — SIGNIFICANT CHANGE UP (ref 0–0.9)
MONOCYTES NFR BLD AUTO: 6.3 % — SIGNIFICANT CHANGE UP (ref 2–14)
NEUTROPHILS # BLD AUTO: 6.45 K/UL — SIGNIFICANT CHANGE UP (ref 1.8–7.4)
NEUTROPHILS NFR BLD AUTO: 70.6 % — SIGNIFICANT CHANGE UP (ref 43–77)
NRBC # BLD: 0 /100 WBCS — SIGNIFICANT CHANGE UP (ref 0–0)
NRBC # FLD: 0 K/UL — SIGNIFICANT CHANGE UP (ref 0–0)
PLATELET # BLD AUTO: 247 K/UL — SIGNIFICANT CHANGE UP (ref 150–400)
POTASSIUM SERPL-MCNC: 3.6 MMOL/L — SIGNIFICANT CHANGE UP (ref 3.5–5.3)
POTASSIUM SERPL-SCNC: 3.6 MMOL/L — SIGNIFICANT CHANGE UP (ref 3.5–5.3)
PROT SERPL-MCNC: 6 G/DL — SIGNIFICANT CHANGE UP (ref 6–8.3)
PROTHROM AB SERPL-ACNC: 9.6 SEC — SIGNIFICANT CHANGE UP (ref 9.5–13)
RBC # BLD: 3.18 M/UL — LOW (ref 3.8–5.2)
RBC # FLD: 13.8 % — SIGNIFICANT CHANGE UP (ref 10.3–14.5)
SODIUM SERPL-SCNC: 135 MMOL/L — SIGNIFICANT CHANGE UP (ref 135–145)
URATE SERPL-MCNC: 4.2 MG/DL — SIGNIFICANT CHANGE UP (ref 2.5–7)
WBC # BLD: 9.12 K/UL — SIGNIFICANT CHANGE UP (ref 3.8–10.5)
WBC # FLD AUTO: 9.12 K/UL — SIGNIFICANT CHANGE UP (ref 3.8–10.5)

## 2023-08-22 PROCEDURE — 99213 OFFICE O/P EST LOW 20 MIN: CPT

## 2023-08-22 RX ORDER — METOCLOPRAMIDE HCL 10 MG
10 TABLET ORAL ONCE
Refills: 0 | Status: COMPLETED | OUTPATIENT
Start: 2023-08-22 | End: 2023-08-22

## 2023-08-22 RX ORDER — ACETAMINOPHEN 500 MG
1000 TABLET ORAL ONCE
Refills: 0 | Status: COMPLETED | OUTPATIENT
Start: 2023-08-22 | End: 2023-08-22

## 2023-08-22 RX ORDER — DIPHENHYDRAMINE HCL 50 MG
25 CAPSULE ORAL ONCE
Refills: 0 | Status: COMPLETED | OUTPATIENT
Start: 2023-08-22 | End: 2023-08-22

## 2023-08-22 RX ADMIN — Medication 25 MILLIGRAM(S): at 02:43

## 2023-08-22 RX ADMIN — Medication 1000 MILLIGRAM(S): at 02:43

## 2023-08-22 RX ADMIN — Medication 1000 MILLIGRAM(S): at 03:13

## 2023-08-22 RX ADMIN — Medication 10 MILLIGRAM(S): at 02:43

## 2023-08-22 NOTE — OB POSTPARTUM TRIAGE NOTE - FALL HARM RISK - UNIVERSAL INTERVENTIONS
Bed in lowest position, wheels locked, appropriate side rails in place/Call bell, personal items and telephone in reach/Instruct patient to call for assistance before getting out of bed or chair/Non-slip footwear when patient is out of bed/[place] to call system/Physically safe environment - no spills, clutter or unnecessary equipment/Purposeful Proactive Rounding/Room/bathroom lighting operational, light cord in reach

## 2023-08-22 NOTE — OB POSTPARTUM TRIAGE NOTE - NSHPLABSRESULTS_GEN_ALL_CORE
CBC Full  -  ( 22 Aug 2023 02:30 )  WBC Count : 9.12 K/uL  RBC Count : 3.18 M/uL  Hemoglobin : 9.8 g/dL  Hematocrit : 29.7 %  Platelet Count - Automated : 247 K/uL  Mean Cell Volume : 93.4 fL  Mean Cell Hemoglobin : 30.8 pg  Mean Cell Hemoglobin Concentration : 33.0 gm/dL  Auto Neutrophil # : 6.45 K/uL  Auto Lymphocyte # : 1.82 K/uL  Auto Monocyte # : 0.57 K/uL  Auto Eosinophil # : 0.19 K/uL  Auto Basophil # : 0.03 K/uL  Auto Neutrophil % : 70.6 %  Auto Lymphocyte % : 20.0 %  Auto Monocyte % : 6.3 %  Auto Eosinophil % : 2.1 %  Auto Basophil % : 0.3 %  08-22    135  |  102  |  11  ----------------------------<  124<H>  3.6   |  19<L>  |  0.51    Ca    8.4      22 Aug 2023 02:30    TPro  6.0  /  Alb  3.2<L>  /  TBili  <0.2  /  DBili  x   /  AST  22  /  ALT  22  /  AlkPhos  100  08-22  PT/INR - ( 22 Aug 2023 02:30 )   PT: 9.6 sec;   INR: <0.90 ratio         PTT - ( 22 Aug 2023 02:30 )  PTT:29.0 sec  fibrinogen CBC Full  -  ( 22 Aug 2023 02:30 )  WBC Count : 9.12 K/uL  RBC Count : 3.18 M/uL  Hemoglobin : 9.8 g/dL  Hematocrit : 29.7 %  Platelet Count - Automated : 247 K/uL  Mean Cell Volume : 93.4 fL  Mean Cell Hemoglobin : 30.8 pg  Mean Cell Hemoglobin Concentration : 33.0 gm/dL  Auto Neutrophil # : 6.45 K/uL  Auto Lymphocyte # : 1.82 K/uL  Auto Monocyte # : 0.57 K/uL  Auto Eosinophil # : 0.19 K/uL  Auto Basophil # : 0.03 K/uL  Auto Neutrophil % : 70.6 %  Auto Lymphocyte % : 20.0 %  Auto Monocyte % : 6.3 %  Auto Eosinophil % : 2.1 %  Auto Basophil % : 0.3 %  08-22    135  |  102  |  11  ----------------------------<  124<H>  3.6   |  19<L>  |  0.51    Ca    8.4      22 Aug 2023 02:30    TPro  6.0  /  Alb  3.2<L>  /  TBili  <0.2  /  DBili  x   /  AST  22  /  ALT  22  /  AlkPhos  100  08-22  PT/INR - ( 22 Aug 2023 02:30 )   PT: 9.6 sec;   INR: <0.90 ratio         PTT - ( 22 Aug 2023 02:30 )  PTT:29.0 sec  fibrinogen 388

## 2023-08-22 NOTE — OB POSTPARTUM TRIAGE NOTE - HISTORY OF PRESENT ILLNESS
32yo female  S/P C/S 23 breech presentation, IVF pregnancy, came in to triage with C/O of HA since 930 pain 4/10, SOB, BP @ home 2230 140/98, 2330 133/93 32yo female  S/P C/S 23 breech presentation, IVF pregnancy, came in to triage with C/O of HA since 930 pain 4/10, SOB that is resolved now, BP @ home 2230 140/98, 2330 133/93

## 2023-08-22 NOTE — OB POSTPARTUM TRIAGE NOTE - NSHPPHYSICALEXAM_GEN_ALL_CORE
T(C): --  HR: 72  BP:   RR: --  SpO2: --    A&Ox3,, FS :79  Heart: RRR, S1&S2, no S3  Lungs: Clear bilateral to auscultation, good inspiratory /expiratory effort              no rhonchi, no rales T(C): --36.6  HR: 60-70  BP: 103//84  RR: --16  SpO2: --      GENERAL: no weakness, no fever/chills, no weight loss/gain  EYES/ENT: No visual changes, no vertigo or throat pain  NECK: No pain or stiffness   RESPIRATORY: no cough, no wheezing, no hemoptysis, no dyspnea, no shortness of breath  CARDIOVASCULAR: no chest pain or palpitations  GASTROINTESTINAL: no n/v/d, no abdominal or epigastric pain  GENITOURINARY: no dysuria, no frequency, no nocturia, no hematuria  MUSCULOSKELETAL: no trauma, no sprain/strain, no myalgias, no arthralgias, no fracture  NEUROLOGICAL: no HA, no dizziness, no weakness, no numbness  SKIN: No itching, rashes T(C): --36.6  HR: 60-70  BP: 103//84  RR: --16  SpO2: --      GENERAL: no weakness, no fever/chills, no weight loss/gain  EYES/ENT: No visual changes, no vertigo or throat pain  NECK: No pain or stiffness   RESPIRATORY: no cough, no wheezing, no hemoptysis, no dyspnea, no shortness of breath  CARDIOVASCULAR: no chest pain or palpitations  GASTROINTESTINAL: no n/v/d, no abdominal or epigastric pain  GENITOURINARY: no dysuria, no frequency, no nocturia, no hematuria  MUSCULOSKELETAL: no trauma, no sprain/strain, no myalgias, no arthralgias, no fracture  NEUROLOGICAL: HA improved with medication 2/10, no dizziness, no weakness, no numbness  SKIN: No itching, rashes

## 2023-08-22 NOTE — OB POSTPARTUM TRIAGE NOTE - NS_NEWBORNACONDIT_OBGYN_ALL_OB
Liveborn Sski Pregnancy And Lactation Text: This medication is Pregnancy Category D and isn't considered safe during pregnancy. It is excreted in breast milk.

## 2023-08-22 NOTE — OB POSTPARTUM TRIAGE NOTE - NS_PRENATALCARE_OBGYN_ALL_OB
PRIMARY DIAGNOSIS: Visual Hallucination/UTI  OUTPATIENT/OBSERVATION GOALS TO BE MET BEFORE DISCHARGE:  ADLs back to baseline: No    Activity and level of assistance: AX1 in bed    Pain status: Improved-controlled with oral pain medications.    Return to near baseline physical activity: No     Discharge Planner Nurse   Safe discharge environment identified: No  Barriers to discharge: Yes       Entered by: Dai Aguilera RN 07/29/2023    Blood pressure 122/75, pulse 85, temperature 97.1  F (36.2  C), temperature source Oral, resp. rate 20, SpO2 93 %.     Please review provider order for any additional goals.   Nurse to notify provider when observation goals have been met and patient is ready for discharge.       Yes

## 2023-08-22 NOTE — OB POSTPARTUM TRIAGE NOTE - NSOBPROVIDERNOTE_OBGYN_ALL_OB_FT
9450 discuss with Dr. Shravan zelaya for discharge  Discharge patient home.  will follow up with OB for the next schedule appointment.  All ordered tests results reviewed and interpreted.  Plan of care was reviewed with patient and family; patient states understanding of the above plan.  Pt discharged home @ 0400Am 32yo female  S/P C/S 23 breech presentation, IVF pregnancy, elevated blood pressure @ home, HA  no S/S of PP PEC at this time  0345 discuss with Dr. Shravan zelaya for discharge  Discharge patient home.  will follow up with OB for the next schedule appointment.  All ordered tests results reviewed and interpreted.  Plan of care was reviewed with patient and family; patient states understanding of the above plan.  Pt discharged home @ 0400Am

## 2023-08-24 ENCOUNTER — NON-APPOINTMENT (OUTPATIENT)
Age: 33
End: 2023-08-24

## 2023-08-24 DIAGNOSIS — R51.9 HEADACHE, UNSPECIFIED: ICD-10-CM

## 2023-08-30 ENCOUNTER — APPOINTMENT (OUTPATIENT)
Dept: OBGYN | Facility: CLINIC | Age: 33
End: 2023-08-30
Payer: COMMERCIAL

## 2023-08-30 ENCOUNTER — ASOB RESULT (OUTPATIENT)
Age: 33
End: 2023-08-30

## 2023-08-30 VITALS
DIASTOLIC BLOOD PRESSURE: 71 MMHG | HEART RATE: 88 BPM | WEIGHT: 138 LBS | SYSTOLIC BLOOD PRESSURE: 104 MMHG | BODY MASS INDEX: 22.99 KG/M2 | HEIGHT: 65 IN

## 2023-08-30 DIAGNOSIS — R59.0 LOCALIZED ENLARGED LYMPH NODES: ICD-10-CM

## 2023-08-30 PROCEDURE — 0503F POSTPARTUM CARE VISIT: CPT

## 2023-08-30 PROCEDURE — 99213 OFFICE O/P EST LOW 20 MIN: CPT

## 2023-08-30 PROCEDURE — 76830 TRANSVAGINAL US NON-OB: CPT

## 2023-09-04 NOTE — HISTORY OF PRESENT ILLNESS
[Postpartum Follow Up] : postpartum follow up [Complications:___] : complications include: [unfilled] [Delivery Date: ___] : on [unfilled] [Rubella Vaccine] : Rubella vaccine administered [Pertussis Vaccine] : Pertussis vaccine administered [Boy] : baby is a boy [Infant's Name ___] : [unfilled] [___ Lbs] : [unfilled] lbs [___ Oz] : [unfilled] oz [Living at Home] : is currently living at home [Intended Contraception] : Intended Contraception: [Currently Experiencing] : The patient is currently experiencing symptoms. [Abdominal Pain] : abdominal pain [Irregular Bleeding] : irregular bleeding [Primary C/S] : delivered by  section [Clean/Dry/Intact] : clean, dry and intact [Back to Normal] : is back to normal in size [Normal] : the vagina was normal [Doing Well] : is doing well [Examination Of The Breasts] : breasts are normal [No Sign of Infection] : is showing no signs of infection [Excellent Pain Control] : has excellent pain control [None] : None [Breastfeeding] : not currently nursing [Resumed Menses] : has not resumed her menses [Resumed [place]] : has not resumed intercourse [Breast Pain] : no breast pain [S/Sx PP Depression] : no signs/symptoms of postpartum depression [Mild] : mild vaginal bleeding [Soft] : soft [Tender] : non tender [Distended] : not distended [FreeTextEntry8] : 33 year old pt  LMP postpartum presents for ppv. Pt reports lump in her right adnexa.  [de-identified] : complications [de-identified] : Referral given for right axilla and right breast US. RTO prn.  [FreeTextEntry1] : Probable subinvolution Will f/up pelvic sonogram to r/o retained products Benigh axillary mass noted during pregnancy; reassurance given re increased in size given lactating status; U/s referral given if sx worsen

## 2023-09-25 LAB
HCT VFR BLD CALC: 35.2 %
HGB BLD-MCNC: 11.6 G/DL
MCHC RBC-ENTMCNC: 30.3 PG
MCHC RBC-ENTMCNC: 33 GM/DL
MCV RBC AUTO: 91.9 FL
PLATELET # BLD AUTO: 375 K/UL
RBC # BLD: 3.83 M/UL
RBC # FLD: 13.3 %
WBC # FLD AUTO: 9.7 K/UL

## 2023-09-26 ENCOUNTER — APPOINTMENT (OUTPATIENT)
Dept: OBGYN | Facility: CLINIC | Age: 33
End: 2023-09-26
Payer: COMMERCIAL

## 2023-09-26 VITALS
SYSTOLIC BLOOD PRESSURE: 107 MMHG | HEART RATE: 69 BPM | HEIGHT: 65 IN | WEIGHT: 138 LBS | BODY MASS INDEX: 22.99 KG/M2 | DIASTOLIC BLOOD PRESSURE: 76 MMHG

## 2023-09-26 DIAGNOSIS — Z86.32 PERSONAL HISTORY OF GESTATIONAL DIABETES: ICD-10-CM

## 2023-09-26 DIAGNOSIS — Z11.3 ENCOUNTER FOR SCREENING FOR INFECTIONS WITH A PREDOMINANTLY SEXUAL MODE OF TRANSMISSION: ICD-10-CM

## 2023-09-26 DIAGNOSIS — O26.899 OTHER SPECIFIED PREGNANCY RELATED CONDITIONS, UNSPECIFIED TRIMESTER: ICD-10-CM

## 2023-09-26 DIAGNOSIS — O24.419 GESTATIONAL DIABETES MELLITUS IN PREGNANCY, UNSPECIFIED CONTROL: ICD-10-CM

## 2023-09-26 DIAGNOSIS — O21.9 VOMITING OF PREGNANCY, UNSPECIFIED: ICD-10-CM

## 2023-09-26 DIAGNOSIS — Z34.02 ENCOUNTER FOR SUPERVISION OF NORMAL FIRST PREGNANCY, SECOND TRIMESTER: ICD-10-CM

## 2023-09-26 DIAGNOSIS — Z34.01 ENCOUNTER FOR SUPERVISION OF NORMAL FIRST PREGNANCY, FIRST TRIMESTER: ICD-10-CM

## 2023-09-26 DIAGNOSIS — Z67.91 OTHER SPECIFIED PREGNANCY RELATED CONDITIONS, UNSPECIFIED TRIMESTER: ICD-10-CM

## 2023-09-26 PROCEDURE — 0503F POSTPARTUM CARE VISIT: CPT

## 2023-09-26 RX ORDER — URINE ACETONE TEST STRIPS
STRIP MISCELLANEOUS
Qty: 1 | Refills: 0 | Status: DISCONTINUED | COMMUNITY
Start: 2023-06-07 | End: 2023-09-26

## 2023-09-26 RX ORDER — CHOLECALCIFEROL (VITAMIN D3) 10(400)/ML
DROPS ORAL
Qty: 4 | Refills: 1 | Status: DISCONTINUED | COMMUNITY
Start: 2023-06-07 | End: 2023-09-26

## 2023-09-26 RX ORDER — BLOOD-GLUCOSE METER
W/DEVICE KIT MISCELLANEOUS
Qty: 1 | Refills: 0 | Status: DISCONTINUED | COMMUNITY
Start: 2023-06-07 | End: 2023-09-26

## 2023-09-26 RX ORDER — BLOOD-GLUCOSE METER
KIT MISCELLANEOUS
Qty: 4 | Refills: 0 | Status: DISCONTINUED | COMMUNITY
Start: 2023-06-07 | End: 2023-09-26

## 2023-10-02 LAB
C TRACH RRNA SPEC QL NAA+PROBE: NOT DETECTED
HBV SURFACE AG SER QL: NONREACTIVE
HCV AB SER QL: NONREACTIVE
HCV S/CO RATIO: 0.07 S/CO
HIV1+2 AB SPEC QL IA.RAPID: NONREACTIVE
N GONORRHOEA RRNA SPEC QL NAA+PROBE: NOT DETECTED
SOURCE AMPLIFICATION: NORMAL
T PALLIDUM AB SER QL IA: NEGATIVE

## 2023-10-03 ENCOUNTER — NON-APPOINTMENT (OUTPATIENT)
Age: 33
End: 2023-10-03

## 2023-11-20 ENCOUNTER — TRANSCRIPTION ENCOUNTER (OUTPATIENT)
Age: 33
End: 2023-11-20

## 2024-02-05 ENCOUNTER — TRANSCRIPTION ENCOUNTER (OUTPATIENT)
Age: 34
End: 2024-02-05

## 2024-02-09 ENCOUNTER — APPOINTMENT (OUTPATIENT)
Dept: ULTRASOUND IMAGING | Facility: CLINIC | Age: 34
End: 2024-02-09

## 2024-02-13 ENCOUNTER — APPOINTMENT (OUTPATIENT)
Dept: OBGYN | Facility: CLINIC | Age: 34
End: 2024-02-13

## 2024-03-26 ENCOUNTER — APPOINTMENT (OUTPATIENT)
Dept: OBGYN | Facility: CLINIC | Age: 34
End: 2024-03-26
Payer: COMMERCIAL

## 2024-03-26 VITALS
BODY MASS INDEX: 19.66 KG/M2 | HEART RATE: 76 BPM | WEIGHT: 118 LBS | HEIGHT: 65 IN | DIASTOLIC BLOOD PRESSURE: 70 MMHG | SYSTOLIC BLOOD PRESSURE: 109 MMHG

## 2024-03-26 DIAGNOSIS — R63.4 ABNORMAL WEIGHT LOSS: ICD-10-CM

## 2024-03-26 DIAGNOSIS — Z01.419 ENCOUNTER FOR GYNECOLOGICAL EXAMINATION (GENERAL) (ROUTINE) W/OUT ABNORMAL FINDINGS: ICD-10-CM

## 2024-03-26 DIAGNOSIS — N81.89 OTHER FEMALE GENITAL PROLAPSE: ICD-10-CM

## 2024-03-26 PROCEDURE — 99459 PELVIC EXAMINATION: CPT

## 2024-03-26 PROCEDURE — 99395 PREV VISIT EST AGE 18-39: CPT

## 2024-03-28 NOTE — PHYSICAL EXAM
[Chaperone Present] : A chaperone was present in the examining room during all aspects of the physical examination [Appropriately responsive] : appropriately responsive [Alert] : alert [No Acute Distress] : no acute distress [No Lymphadenopathy] : no lymphadenopathy [Soft] : soft [Non-tender] : non-tender [Non-distended] : non-distended [No HSM] : No HSM [No Lesions] : no lesions [No Mass] : no mass [Oriented x3] : oriented x3 [Examination Of The Breasts] : a normal appearance [Breast Abnormal Secretion Opalescent Fluid] : a milky discharge [No Masses] : no breast masses were palpable [Labia Majora] : normal [Labia Minora] : normal [Normal] : normal [Uterine Adnexae] : normal [FreeTextEntry1] : KAYLEEN Ervin

## 2024-03-28 NOTE — END OF VISIT
[FreeTextEntry3] : I, Ingrid Bernstein , acted as a scribe on behalf of Dr. Brittany Chen on 03/26/2024 .  All medical entries made by the scribe were at my, Dr. Brittany Chen, direction and personally dictated by me on 03/26/2024 .. I have reviewed the chart and agree that the record accurately reflects my personal performance of the history, physical exam, assessment and plan. I have also personally directed, reviewed, and agreed with the chart.

## 2024-03-28 NOTE — HISTORY OF PRESENT ILLNESS
[N] : Patient is not sexually active [FreeTextEntry1] : 33 old P1 LMP ? breastfeeding presents for annual gyn visit. Pt feels well and has no complaints. She has normal menses. She denies intermenstrual bleeding, itching, burning, or abnormal discharge. She is considering in conceiving soon. She reports unintentional weight loss with breastfeeding.  She also  reports pelvic pain. Otherwise feels well.  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

## 2024-03-28 NOTE — PLAN
[FreeTextEntry1] : 33 old breastfeeding presents for annual gyn visit.   TSH bloodwork done  Pelvic floor therapy   Pap/HPV done today   Discussed fertility options   RTO in 1 year

## 2024-04-08 LAB
C TRACH RRNA SPEC QL NAA+PROBE: NOT DETECTED
CYTOLOGY CVX/VAG DOC THIN PREP: NORMAL
HBV SURFACE AG SER QL: NONREACTIVE
HCV AB SER QL: NONREACTIVE
HCV S/CO RATIO: 0.1 S/CO
HIV1+2 AB SPEC QL IA.RAPID: NONREACTIVE
HPV HIGH+LOW RISK DNA PNL CVX: NOT DETECTED
N GONORRHOEA RRNA SPEC QL NAA+PROBE: NOT DETECTED
SOURCE TP AMPLIFICATION: NORMAL
T PALLIDUM AB SER QL IA: NEGATIVE
TSH SERPL-ACNC: 1.68 UIU/ML

## 2024-04-21 ENCOUNTER — NON-APPOINTMENT (OUTPATIENT)
Age: 34
End: 2024-04-21

## 2024-04-26 ENCOUNTER — NON-APPOINTMENT (OUTPATIENT)
Age: 34
End: 2024-04-26

## 2024-07-10 ENCOUNTER — NON-APPOINTMENT (OUTPATIENT)
Age: 34
End: 2024-07-10

## 2024-07-23 ENCOUNTER — NON-APPOINTMENT (OUTPATIENT)
Age: 34
End: 2024-07-23

## 2024-07-26 NOTE — OB RN PATIENT PROFILE - HISTORY OF COVID-19 VACCINATION
Continue Regimen: Allegra 180mg QD \\nmontelukast 10 mg tablet \\nSig: Take 1 PO QD\\n\\nclobetasol 0.05 % topical ointment \\nSig: Apply to affected area until clear QD for 2 weeks on/off Discontinue Regimen: Eucrisa Detail Level: Simple Render In Strict Bullet Format?: No Yes Detail Level: Zone Initiate Treatment: Opzelura 1.5 % topical cream \\nQuantity: 60.0 g  Days Supply: 30\\nSig: Apply QD to eczema on the face and trunk

## 2024-11-19 ENCOUNTER — NON-APPOINTMENT (OUTPATIENT)
Age: 34
End: 2024-11-19